# Patient Record
Sex: FEMALE | Race: BLACK OR AFRICAN AMERICAN | Employment: UNEMPLOYED | URBAN - METROPOLITAN AREA
[De-identification: names, ages, dates, MRNs, and addresses within clinical notes are randomized per-mention and may not be internally consistent; named-entity substitution may affect disease eponyms.]

---

## 2018-10-18 ENCOUNTER — HOSPITAL ENCOUNTER (EMERGENCY)
Facility: CLINIC | Age: 43
Discharge: HOME | End: 2018-10-18
Attending: EMERGENCY MEDICINE
Payer: MEDICARE

## 2018-10-18 ENCOUNTER — APPOINTMENT (OUTPATIENT)
Dept: GENERAL RADIOLOGY | Facility: CLINIC | Age: 43
End: 2018-10-18
Payer: MEDICARE

## 2018-10-18 VITALS
HEART RATE: 83 BPM | DIASTOLIC BLOOD PRESSURE: 76 MMHG | WEIGHT: 115 LBS | HEIGHT: 62 IN | SYSTOLIC BLOOD PRESSURE: 137 MMHG | TEMPERATURE: 97.8 F | OXYGEN SATURATION: 98 % | RESPIRATION RATE: 17 BRPM | BODY MASS INDEX: 21.16 KG/M2

## 2018-10-18 DIAGNOSIS — M79.662 PAIN OF LEFT LOWER LEG: ICD-10-CM

## 2018-10-18 DIAGNOSIS — S90.32XA CONTUSION OF LEFT FOOT INCLUDING TOES, INITIAL ENCOUNTER: Primary | ICD-10-CM

## 2018-10-18 DIAGNOSIS — S90.122A CONTUSION OF LEFT FOOT INCLUDING TOES, INITIAL ENCOUNTER: Primary | ICD-10-CM

## 2018-10-18 PROCEDURE — 73590 X-RAY EXAM OF LOWER LEG: CPT

## 2018-10-18 PROCEDURE — 99283 EMERGENCY DEPT VISIT LOW MDM: CPT

## 2018-10-18 PROCEDURE — 73630 X-RAY EXAM OF FOOT: CPT

## 2018-10-18 ASSESSMENT — PAIN DESCRIPTION - DESCRIPTORS: DESCRIPTORS: ACHING

## 2018-10-18 ASSESSMENT — ENCOUNTER SYMPTOMS
VOMITING: 0
CONSTIPATION: 0
COUGH: 0
ABDOMINAL PAIN: 0
EYE PAIN: 0
SHORTNESS OF BREATH: 0
BACK PAIN: 0
BLOOD IN STOOL: 0
DIARRHEA: 0
NAUSEA: 0

## 2018-10-18 ASSESSMENT — PAIN SCALES - GENERAL
PAINLEVEL_OUTOF10: 1
PAINLEVEL_OUTOF10: 4

## 2018-10-18 ASSESSMENT — PAIN DESCRIPTION - PAIN TYPE
TYPE: ACUTE PAIN
TYPE: ACUTE PAIN

## 2018-10-18 ASSESSMENT — PAIN DESCRIPTION - ORIENTATION
ORIENTATION: LEFT
ORIENTATION: LEFT

## 2018-10-18 ASSESSMENT — PAIN DESCRIPTION - LOCATION
LOCATION: FOOT;KNEE;ANKLE
LOCATION: FOOT

## 2018-10-18 ASSESSMENT — PAIN DESCRIPTION - PROGRESSION: CLINICAL_PROGRESSION: NOT CHANGED

## 2020-02-01 ENCOUNTER — APPOINTMENT (OUTPATIENT)
Dept: GENERAL RADIOLOGY | Facility: CLINIC | Age: 45
End: 2020-02-01
Payer: COMMERCIAL

## 2020-02-01 ENCOUNTER — HOSPITAL ENCOUNTER (EMERGENCY)
Facility: CLINIC | Age: 45
Discharge: HOME | End: 2020-02-01
Attending: EMERGENCY MEDICINE
Payer: COMMERCIAL

## 2020-02-01 VITALS
HEART RATE: 98 BPM | DIASTOLIC BLOOD PRESSURE: 92 MMHG | SYSTOLIC BLOOD PRESSURE: 150 MMHG | HEIGHT: 63 IN | OXYGEN SATURATION: 98 % | RESPIRATION RATE: 17 BRPM | TEMPERATURE: 98.5 F | WEIGHT: 118 LBS | BODY MASS INDEX: 20.91 KG/M2

## 2020-02-01 PROCEDURE — 99283 EMERGENCY DEPT VISIT LOW MDM: CPT

## 2020-02-01 PROCEDURE — 73630 X-RAY EXAM OF FOOT: CPT

## 2020-02-01 RX ORDER — GLATIRAMER 40 MG/ML
1 INJECTION, SOLUTION SUBCUTANEOUS
COMMUNITY
End: 2021-10-20

## 2020-02-01 RX ORDER — CHOLECALCIFEROL (VITAMIN D3) 1MM UNIT/G
LIQUID (ML) MISCELLANEOUS
COMMUNITY

## 2020-02-01 ASSESSMENT — PAIN DESCRIPTION - ORIENTATION: ORIENTATION: ANTERIOR

## 2020-02-01 ASSESSMENT — PAIN DESCRIPTION - DESCRIPTORS: DESCRIPTORS: SORE

## 2020-02-01 ASSESSMENT — PAIN DESCRIPTION - PAIN TYPE: TYPE: ACUTE PAIN

## 2020-02-01 ASSESSMENT — PAIN SCALES - GENERAL: PAINLEVEL_OUTOF10: 3

## 2020-02-01 ASSESSMENT — PAIN DESCRIPTION - LOCATION: LOCATION: FOOT

## 2020-02-01 ASSESSMENT — PAIN DESCRIPTION - FREQUENCY: FREQUENCY: CONTINUOUS

## 2020-02-02 NOTE — ED PROVIDER NOTES
eMERGENCY dEPARTMENT eNCOUnter      Pt Name: Rosey Tierney  MRN: [id number]  [place] 1975  Date of evaluation: 2/1/2020      CHIEF COMPLAINT       Chief Complaint   Patient presents with    Foot Injury     LEFT dropped a tote on top of foot on wednesday PMS         HISTORY OF PRESENT ILLNESS    Rosey Tierney is a 38 y.o. female who presents with left foot pain. Patient states on Wednesday she had  Checked a negative plastic top. She has been having pain and bruising to the area. Now starting to go to the side of her foot. No numbness, tingling, weakness. She is refusing anything for pain here        REVIEW OF SYSTEMS       Positive left foot pain. Positive bruising. Negative for numbness, tingling or weakness     PAST MEDICAL HISTORY    has a past medical history of Abnormal Pap smear, Abnormal Pap smear of cervix, Anxiety, Asthma, Depression, Migraine, Multiple sclerosis ([address] and Post-traumatic stress syndrome. SURGICAL HISTORY      has a past surgical history that includes Colposcopy; [place] tooth extraction; Corneal transplant (Bilateral); Tear duct surgery (Bilateral); and Eye surgery. CURRENT MEDICATIONS       Previous Medications    AMPHETAMINE (ADZENYS XR-ODT PO)    Take by mouth    BUTALBITAL-ACETAMINOPHEN-CAFFEINE (FIORICET, ESGIC) PER TABLET    Take 1 tablet by mouth as needed for Pain. CHOLECALCIFEROL (VITAMIN D3) 7660438 UNIT/GM LIQD    4 ml in AM, 2 ml in afternoon and 4 ml HS    FLUTICASONE PROPIONATE  HFA (FLOVENT HFA IN)    Inhale  into the lungs. GLATIRAMER ACETATE (COPAXONE) 40 MG/ML SOSY    Inject 1 mL into the skin    HYDROCODONE-ACETAMINOPHEN (NORCO) 5-325 MG PER TABLET        HYDROXYZINE (VISTARIL) 25 MG CAPSULE    Take 25 mg by mouth as needed. LAMOTRIGINE (LAMICTAL) 100 MG TABLET    Take 100 mg by mouth 2 times daily. PROAIR  (90 BASE) MCG/ACT INHALER        RESPIRATORY THERAPY SUPPLIES (NEBULIZER) EARNESTINE    by Does not apply route. interpretations:  XR FOOT LEFT (MIN 3 VIEWS)   Final Result   Acute nondisplaced fracture of the 5th proximal phalanx. LABS:  Labs Reviewed - No data to display      EMERGENCY DEPARTMENT COURSE:   Vitals:    Vitals:    02/01/20 2013   BP: (!) 150/92   Pulse: 98   Resp: 17   Temp: 98.5 °F (36.9 °C)   TempSrc: Oral   SpO2: 98%   Weight: 53.5 kg (118 lb)   Height: 5' 3\" (1.6 m)     -------------------------  BP: (!) 150/92, Temp: 98.5 °F (36.9 °C), Pulse: 98, Resp: 17    No orders of the defined types were placed in this encounter. Re-evaluation Notes    X-ray per radiology shows nondisplaced fracture at the base of the fifth proximal phalanx. Patient be placed in a postop shoe. Instructed over-the-counter Tylenol, Motrin for discomfort. Follow up as directed. Return if worse        FINAL IMPRESSION      1. Closed fracture of phalanx of left fifth toe, initial encounter          DISPOSITION/PLAN   DISPOSITION Decision To Discharge 02/01/2020 09:06:07 PM      Condition on Disposition    Stable    PATIENT REFERRED TO:  Remi Rogers MD  [address] [place] [address], [address] [place] [address]  [phone]    In 2 days        DISCHARGE MEDICATIONS:  New Prescriptions    No medications on file       (Please note that portions of this note were completed with a voice recognition program.  Efforts were made to edit the dictations but occasionally words are mis-transcribed.)    Ernst MD, F.A.C.E.P.   Attending Emergency Physician        iLz Wheat MD  02/01/20 0500

## 2021-10-20 ENCOUNTER — OFFICE VISIT (OUTPATIENT)
Dept: NEUROLOGY | Age: 46
End: 2021-10-20
Payer: COMMERCIAL

## 2021-10-20 VITALS
SYSTOLIC BLOOD PRESSURE: 125 MMHG | HEART RATE: 91 BPM | DIASTOLIC BLOOD PRESSURE: 79 MMHG | WEIGHT: 118 LBS | BODY MASS INDEX: 20.91 KG/M2 | HEIGHT: 63 IN

## 2021-10-20 DIAGNOSIS — E55.9 VITAMIN D DEFICIENCY: ICD-10-CM

## 2021-10-20 DIAGNOSIS — R27.0 ATAXIA: ICD-10-CM

## 2021-10-20 DIAGNOSIS — R26.9 GAIT DIFFICULTY: ICD-10-CM

## 2021-10-20 DIAGNOSIS — G95.9 MYELOPATHY (HCC): ICD-10-CM

## 2021-10-20 DIAGNOSIS — G35 EXACERBATION OF MULTIPLE SCLEROSIS (HCC): Primary | ICD-10-CM

## 2021-10-20 DIAGNOSIS — E53.9 VITAMIN B DEFICIENCY: ICD-10-CM

## 2021-10-20 DIAGNOSIS — B01.89 VARICELLA WITH COMPLICATION: ICD-10-CM

## 2021-10-20 DIAGNOSIS — R76.8 JC VIRUS ANTIBODY POSITIVE: ICD-10-CM

## 2021-10-20 PROCEDURE — 99205 OFFICE O/P NEW HI 60 MIN: CPT | Performed by: PSYCHIATRY & NEUROLOGY

## 2021-10-20 NOTE — LETTER
[facility]  [address] \[facility]\[address]  Phone: [phone]  Fax: [phone]    Aron Cannon MD    2021     Shawn Marshall, [address]  [place]  [address]    Patient: Abby Ellis   MR Number: [id number]   YOB: 1975   Date of Visit: 10/20/2021       Dear Shawn Marshall: Thank you for referring Yadi Rincon to me for evaluation/treatment. Below are the relevant portions of my assessment and plan of care. NEUROLOGY CONSULT  Patient Name:       Abby Ellis  :        1975  Clinic Visit Date:    10/20/2021      Dear Dr. Fito Anguiano MD     I had the opportunity to see your patient, Ms. Abby Ellis in neurology consultation today. As you know she  is a 40 y.o. right handed female presented for establishment of with known diagnosis of multiple sclerosis since . She is accompanied to the clinic by Mr Wilman Kaplan, significant other. Patient stated that she has had difficulties with walking and vision changes and she was diagnosed with multiple sclerosis in . She was on IV Solu-Medrol for few times at the onset of multiple sclerosis and she was on Copaxone for 3 years. She had suffered from side effects with blurred vision. Then it was switched to Vumerity capsules and she took it for 2 weeks. At that time her dad  of Covid vaccine. At that time; because of grief; she did not follow-up and never continued Vumerity capsules. Patient is transitioning from the care of her primary neurologist at the [facility], who retired recently. Presently she is having extreme difficulty walking and her problems had been getting worse for the past few weeks. She also has been having bladder dysfunction with urgency. She also has fatigue and tremulousness with abnormal involuntary movements. Also has a speech difficulty along with ongoing headaches with photophobia.   Also has memory difficulties; remembering recent conversations, etc.  Admits to anxiety and depression but denied suicidal ideations. DISEASE SUMMARY  Date of onset:   Date of diagnosis of MS:   Disease course at onset: Relapsing-Remitting  Current disease course: Relapsing-Remitting  Previous disease therapies: Copaxone (three times weekly) ( - )(caused loss of vision) ; Vumerity capsules (1st 2 wks of May 2021)(no side effects; stopped b/o dad  at that time)  Current disease therapy: none  CSF: (10/3/2016); RBC 1, WBC 2, Protein 41, VDRL -ve; MBP 4.55, IgG syn rate 60.7 ( 0-3.2)  IgG index 2.58 (0-0.7)  JCV serology result and date:   Vitamin D: > 120 (21)  Smoking status: none  EDSS: 7.5 consisting of wheelchair-bound; unable to take few steps; may need to help with transferring; severe ataxia; bladder dysfunction; decreased visual acuity and mild fatigue with anxiety/depression. 9HPT: 1 minute 33.47  T25W: could not do it. Review of systems done by staff reviewed and pertinent positives include Weakness, balance difficulties, tremors, speech difficulty, headaches, light sensitivity, memory difficulties, anxiety/depression. Current Outpatient Medications on File Prior to Visit   Medication Sig Dispense Refill    sertraline (ZOLOFT) 50 MG tablet Take 1 tablet by mouth daily      Cholecalciferol (VITAMIN D3) 5230231 UNIT/GM LIQD 4 ml in AM, 2 ml in afternoon and 4 ml HS      Amphetamine (ADZENYS XR-ODT PO) Take by mouth      PROAIR  (90 BASE) MCG/ACT inhaler       HYDROcodone-acetaminophen (NORCO) 5-325 MG per tablet       lamoTRIgine (LAMICTAL) 100 MG tablet Take 100 mg by mouth daily       butalbital-acetaminophen-caffeine (FIORICET, ESGIC) per tablet Take 1 tablet by mouth as needed for Pain.  Fluticasone Propionate  HFA (FLOVENT HFA IN) Inhale  into the lungs.  Respiratory Therapy Supplies (NEBULIZER) EARNESTINE by Does not apply route.  (Patient not taking: Reported on 10/20/2021)       No current facility-administered medications on file prior to visit. Allergies: Nathalie [place] is allergic to latex, red dye, asa [aspirin], brexpiprazole, naproxen, and other. Past Medical History:   Diagnosis Date    Abnormal Pap smear     unsure when  or what    Abnormal Pap smear of cervix 10/21/15     LSIL (-)HPV    Anxiety     Asthma     Depression     Migraine     Multiple sclerosis (HCC)     Post-traumatic stress syndrome        Past Surgical History:   Procedure Laterality Date    COLPOSCOPY      CORNEAL TRANSPLANT Bilateral     EYE SURGERY      stye removal    TEAR DUCT SURGERY Bilateral     WISDOM TOOTH EXTRACTION       Social History: Nathalie [place]  reports that she has never smoked. She has never used smokeless tobacco. She reports current alcohol use. She reports that she does not use drugs. Family History   Problem Relation Age of Onset   [facility] Mother         bile duct with mets everywhere    Diabetes Mother     High Blood Pressure Mother     Clotting Disorder Mother     Heart Disease Father         CHF    Diabetes Father     COPD Father     Clotting Disorder Father     Breast Cancer Maternal Grandmother     High Blood Pressure Maternal Grandmother     Tuberculosis Paternal Grandmother     Cancer Paternal Grandfather     Heart Disease Paternal Grandfather         CHF    COPD Maternal Grandfather        On exam: Blood pressure 125/79, pulse 91, height 5' 3\" (1.6 m), weight 118 lb (53.5 kg), not currently breastfeeding. NEUROLOGIC EXAMINATION  GENERAL  Appears comfortable and in no distress   HEENT  NC/ AT   NECK  Supple and no bruits heard   MENTAL STATUS:  Alert, oriented, intact memory, no confusion, normal speech, normal language, no hallucination or delusion   CRANIAL NERVES: II     -      PERRLA, decreased visual acuity bilaterally; Fundi reveal intact venous pulsations;   Visual fields intact to confrontation  III,IV,VI -  EOMs full, no afferent defect, no STEVEN, no ptosis  V     -     Diminished LT, PP on rt face   VII    -     Normal facial symmetry  VIII   -     Intact hearing  IX,X -     Symmetrical palate  XI    -     Symmetrical shoulder shrug  XII   -     Midline tongue, no atrophy    MOTOR FUNCTION:  significant for good strength of grade 5/5 in bilateral proximal and distal muscle groups of both upper and lower extremities with normal bulk, normal tone and no involuntary movements, no tremor   SENSORY FUNCTION:  impaired LT, PP, Temp and vibration in rt side of face, arm and leg   CEREBELLAR FUNCTION:  severe dysmetria bilaterally; Rt >> Lt    REFLEX FUNCTION:  Symmetric, no perverted reflex, equivocal on left, flexor on right   STATION and GAIT  needed help for stance and could not walk without 2 person assist; wheel chair bound       Lab Results   Component Value Date    WBC 6.0 11/13/2015    HGB 11.8 (L) 11/13/2015    HCT 37.3 11/13/2015    MCV 89.2 11/13/2015     11/13/2015    LYMPHOPCT 37 11/13/2015    RBC 4.18 11/13/2015    MCH 28.2 11/13/2015    MCHC 31.5 11/13/2015    RDW 14.8 (H) 11/13/2015         Diagnostic data reviewed with the patient:    Neuroimaging studies done at [facility] reviewed:    Cervical spine MRI (with/without) 2/1/2021: High signal foci throughout cervical spinal cord consistent with multiple sclerosis. No associated enhancement. Brain MRI (with/without) 1/31/2021: Extensive periventricular and deep cerebral white matter signal abnormalities; patchy T2/flair hyperintense signal abnormalities in brainstem and medial thalami which were not seen on prior study. No contrast-enhancement. Blood work from [address] [place] [address] reviewed:  CBC 6/11/2021: WBC 6.7, lymphocytes 25.6%. ,  ALC 1.7 (1.5-3.5  BMP 6/11/2021: Sodium 139, BUN 12, creatinine 0.57, glucose 89    Vitamin D 25 1/14/2021: >120  Vitamin B12 1/14/2021: >1500  MILEY 1/14/2021: Negative    Hepatitis BE antibody (9/23/2021): Negative  Hepatitis B core antibody (9/23/2021): Negative. Hepatitis B surface antigen (9/23/2021): Negative  Mycobacterium TB by QuantiFERON gold (9/23/2021): Negative  Varicella-zoster antibody, IgG (9/23/2021): Positive at 4.9 (<0.9)            Impression and Plan: Ms. Abby Ellis is a 40 y.o. female with   Exacerbation of multiple sclerosis; longstanding history of relapsing remitting multiple sclerosis; off of DMT since 2016; on 2-week course of Vumerity in May 2021; since then has not been on any disease modifying therapies for multiple sclerosis. Most recent brain MRI from [facility] reviewed and it demonstrated T2/FLAIR hyperintense lesions in brainstem and thalami which were not seen on prior study. Recommend in-patient admit to [facility] for pulse IV steroid therapy; needs urinalysis with reflex culture, CBC with differential and chest x-ray before initiating pulse IV steroid therapy. Also recommend MRI brain (W/WO), MRI cervical spine (W/WO), MRI thoracic spine (W/W0). ,  Vitamin D 25 hydroxy level, MIREYA virus testing, PT eval and treat. She also needs consultation with ID specialist for abnormal varicella testing. Extensive and detailed discussion done regarding possible options for disease modifying therapeutic agents. She might benefit from highly effective therapeutic agents including IV ocrelizumab or, natalizumab, etc.   Patient and her significant other verbalized understanding those instructions. Also stated that they prefer to get the testing done as outpatient at this point of time. But they will discuss among the results and get back to us. I personally spent total of 70 minutes from 3:45 PM to 4:55 PM with the patient while interviewing the patient, examining the patient, reviewing prior imaging studies and labs and discussing with patient and her significant other about the findings on those imaging studies and labs.   More than 50% of this visit was spent explaining the rationale for diagnosis and treatment and also discussing about further care plan. Follow-up in clinic in 2-4 weeks. This note was partially created using voice recognition software and is inherently subject to errors including those of syntax and \"sound alike\" substitutions which may escape proofreading. In such instances, original meaning may be extrapolated by contextual derivation. All items selected indicate a positive finding. Those items not selected are negative. Constitutional [] Weight loss/gain   [] Fatigue  [] Fever/Chills   HEENT [] Hearing Loss  [] Visual Disturbance  [x] Tinnitus  [] Eye pain   Respiratory [] Shortness of Breath  [] Cough  [] Snoring   Cardiovascular [] Chest Pain  [] Palpitations  [] Lightheaded   GI [] Constipation  [] Diarrhea  [x] Swallowing change    [] Urinary Frequency  [x] Urinary Urgency   Musculoskeletal [] Neck pain  [] Back pain  [] Muscle pain  [] Restless legs   Dermatologic [] Skin changes   Neurologic [x] Memory loss/confusion  [] Seizures  [x] Trouble walking or imbalance  [] Dizziness  [x] Weakness  [] Numbness  [x] Tremors  [x] Speech Difficulty  [x] Headaches  [x] Light Sensitivity  [] Sound Sensitivity   Endocrinology []Excessive thirst  []Excessive hunger   Psychiatric [x] Anxiety/Depression  [] Hallucination   Allergy/immunology [x]Hives/environmental allergies   Hematologic/lymph [] Abnormal bleeding  [x] Abnormal bruising     9HPT: 1 minute 33.47 seconds  T25W: unable to obtain, pt in wheelchair         If you have questions, please do not hesitate to call me. I look forward to following Deidre Arjun along with you.     Sincerely,      Lazaro Haynes MD

## 2021-10-20 NOTE — PROGRESS NOTES
All items selected indicate a positive finding. Those items not selected are negative.   Constitutional [] Weight loss/gain   [] Fatigue  [] Fever/Chills   HEENT [] Hearing Loss  [] Visual Disturbance  [x] Tinnitus  [] Eye pain   Respiratory [] Shortness of Breath  [] Cough  [] Snoring   Cardiovascular [] Chest Pain  [] Palpitations  [] Lightheaded   GI [] Constipation  [] Diarrhea  [x] Swallowing change    [] Urinary Frequency  [x] Urinary Urgency   Musculoskeletal [] Neck pain  [] Back pain  [] Muscle pain  [] Restless legs   Dermatologic [] Skin changes   Neurologic [x] Memory loss/confusion  [] Seizures  [x] Trouble walking or imbalance  [] Dizziness  [x] Weakness  [] Numbness  [x] Tremors  [x] Speech Difficulty  [x] Headaches  [x] Light Sensitivity  [] Sound Sensitivity   Endocrinology []Excessive thirst  []Excessive hunger   Psychiatric [x] Anxiety/Depression  [] Hallucination   Allergy/immunology [x]Hives/environmental allergies   Hematologic/lymph [] Abnormal bleeding  [x] Abnormal bruising     9HPT: 1 minute 33.47 seconds  T25W: unable to obtain, pt in wheelchair

## 2021-10-20 NOTE — PROGRESS NOTES
NEUROLOGY CONSULT  Patient Name:       Jessica Payne  :        1975  Clinic Visit Date:    10/20/2021      Dear Dr. Dustin Thompson MD     I had the opportunity to see your patient, Ms. Jessica Payne in neurology consultation today. As you know she  is a 40 y.o. right handed female presented for establishment of with known diagnosis of multiple sclerosis since . She is accompanied to the clinic by Mr Nu Reyes, significant other. Patient stated that she has had difficulties with walking and vision changes and she was diagnosed with multiple sclerosis in . She was on IV Solu-Medrol for few times at the onset of multiple sclerosis and she was on Copaxone for 3 years. She had suffered from side effects with blurred vision. Then it was switched to Vumerity capsules and she took it for 2 weeks. At that time her dad  of Covid vaccine. At that time; because of grief; she did not follow-up and never continued Vumerity capsules. Patient is transitioning from the care of her primary neurologist at the [facility], who retired recently. Presently she is having extreme difficulty walking and her problems had been getting worse for the past few weeks. She also has been having bladder dysfunction with urgency. She also has fatigue and tremulousness with abnormal involuntary movements. Also has a speech difficulty along with ongoing headaches with photophobia. Also has memory difficulties; remembering recent conversations, etc.  Admits to anxiety and depression but denied suicidal ideations. DISEASE SUMMARY  Date of onset:   Date of diagnosis of MS:   Disease course at onset: Relapsing-Remitting  Current disease course: Relapsing-Remitting  Previous disease therapies: Copaxone (three times weekly) ( - )(caused loss of vision) ;  Vumerity capsules (1st 2 wks of May 2021)(no side effects; stopped b/o dad  at that time)  Current disease therapy: none  CSF: (10/3/2016); RBC 1, WBC 2, Protein 41, VDRL -ve; MBP 4.55, IgG syn rate 60.7 ( 0-3.2)  IgG index 2.58 (0-0.7)  JCV serology result and date:   Vitamin D: > 120 (1/14/21)  Smoking status: none  EDSS: 7.5 consisting of wheelchair-bound; unable to take few steps; may need to help with transferring; severe ataxia; bladder dysfunction; decreased visual acuity and mild fatigue with anxiety/depression. 9HPT: 1 minute 33.47  T25W: could not do it. Review of systems done by staff reviewed and pertinent positives include Weakness, balance difficulties, tremors, speech difficulty, headaches, light sensitivity, memory difficulties, anxiety/depression. Current Outpatient Medications on File Prior to Visit   Medication Sig Dispense Refill    sertraline (ZOLOFT) 50 MG tablet Take 1 tablet by mouth daily      Cholecalciferol (VITAMIN D3) 8537933 UNIT/GM LIQD 4 ml in AM, 2 ml in afternoon and 4 ml HS      Amphetamine (ADZENYS XR-ODT PO) Take by mouth      PROAIR  (90 BASE) MCG/ACT inhaler       HYDROcodone-acetaminophen (NORCO) 5-325 MG per tablet       lamoTRIgine (LAMICTAL) 100 MG tablet Take 100 mg by mouth daily       butalbital-acetaminophen-caffeine (FIORICET, ESGIC) per tablet Take 1 tablet by mouth as needed for Pain.  Fluticasone Propionate  HFA (FLOVENT HFA IN) Inhale  into the lungs.  Respiratory Therapy Supplies (NEBULIZER) EARNESTINE by Does not apply route. (Patient not taking: Reported on 10/20/2021)       No current facility-administered medications on file prior to visit. Allergies: Alessia Ferrari is allergic to latex, red dye, asa [aspirin], brexpiprazole, naproxen, and other.     Past Medical History:   Diagnosis Date    Abnormal Pap smear     unsure when  or what    Abnormal Pap smear of cervix 10/21/15     LSIL (-)HPV    Anxiety     Asthma     Depression     Migraine     Multiple sclerosis (HCC)     Post-traumatic stress syndrome        Past Surgical History:   Procedure Laterality Date    COLPOSCOPY      CORNEAL TRANSPLANT Bilateral     EYE SURGERY      stye removal    TEAR DUCT SURGERY Bilateral     WISDOM TOOTH EXTRACTION       Social History: Talya Solano  reports that she has never smoked. She has never used smokeless tobacco. She reports current alcohol use. She reports that she does not use drugs. Family History   Problem Relation Age of Onset   Carolyne Petersen Cancer Mother         bile duct with mets everywhere    Diabetes Mother     High Blood Pressure Mother     Clotting Disorder Mother     Heart Disease Father         CHF    Diabetes Father     COPD Father     Clotting Disorder Father     Breast Cancer Maternal Grandmother     High Blood Pressure Maternal Grandmother     Tuberculosis Paternal Grandmother     Cancer Paternal Grandfather     Heart Disease Paternal Grandfather         CHF    COPD Maternal Grandfather        On exam: Blood pressure 125/79, pulse 91, height 5' 3\" (1.6 m), weight 118 lb (53.5 kg), not currently breastfeeding. NEUROLOGIC EXAMINATION  GENERAL  Appears comfortable and in no distress   HEENT  NC/ AT   NECK  Supple and no bruits heard   MENTAL STATUS:  Alert, oriented, intact memory, no confusion, normal speech, normal language, no hallucination or delusion   CRANIAL NERVES: II     -      PERRLA, decreased visual acuity bilaterally; Fundi reveal intact venous pulsations;   Visual fields intact to confrontation  III,IV,VI -  EOMs full, no afferent defect, no STEVEN, no ptosis  V     -     Diminished LT, PP on rt face   VII    -     Normal facial symmetry  VIII   -     Intact hearing  IX,X -     Symmetrical palate  XI    -     Symmetrical shoulder shrug  XII   -     Midline tongue, no atrophy    MOTOR FUNCTION:  significant for good strength of grade 5/5 in bilateral proximal and distal muscle groups of both upper and lower extremities with normal bulk, normal tone and no involuntary movements, no tremor   SENSORY FUNCTION:  impaired LT, PP, Temp and vibration in rt side of face, arm and leg   CEREBELLAR FUNCTION:  severe dysmetria bilaterally; Rt >> Lt    REFLEX FUNCTION:  Symmetric, no perverted reflex, equivocal on left, flexor on right   STATION and GAIT  needed help for stance and could not walk without 2 person assist; wheel chair bound       Lab Results   Component Value Date    WBC 6.0 11/13/2015    HGB 11.8 (L) 11/13/2015    HCT 37.3 11/13/2015    MCV 89.2 11/13/2015     11/13/2015    LYMPHOPCT 37 11/13/2015    RBC 4.18 11/13/2015    MCH 28.2 11/13/2015    MCHC 31.5 11/13/2015    RDW 14.8 (H) 11/13/2015         Diagnostic data reviewed with the patient:    Neuroimaging studies done at [facility] reviewed:    Cervical spine MRI (with/without) 2/1/2021: High signal foci throughout cervical spinal cord consistent with multiple sclerosis. No associated enhancement. Brain MRI (with/without) 1/31/2021: Extensive periventricular and deep cerebral white matter signal abnormalities; patchy T2/flair hyperintense signal abnormalities in brainstem and medial thalami which were not seen on prior study. No contrast-enhancement. Blood work from [facility] reviewed:  CBC 6/11/2021: WBC 6.7, lymphocytes 25.6%. ,  ALC 1.7 (1.5-3.5  BMP 6/11/2021: Sodium 139, BUN 12, creatinine 0.57, glucose 89    Vitamin D 25 1/14/2021: >120  Vitamin B12 1/14/2021: >1500  MILEY 1/14/2021: Negative    Hepatitis BE antibody (9/23/2021): Negative  Hepatitis B core antibody (9/23/2021): Negative.   Hepatitis B surface antigen (9/23/2021): Negative  Mycobacterium TB by QuantiFERON gold (9/23/2021): Negative  Varicella-zoster antibody, IgG (9/23/2021): Positive at 4.9 (<0.9)            Impression and Plan: Ms. Sadiq Weiss is a 40 y.o. female with   Exacerbation of multiple sclerosis; longstanding history of relapsing remitting multiple sclerosis; off of DMT since 2016; on 2-week course of Vumerity in May 2021; since then has not been on any disease modifying therapies for multiple sclerosis. Most recent brain MRI from [facility] reviewed and it demonstrated T2/FLAIR hyperintense lesions in brainstem and thalami which were not seen on prior study. Recommend in-patient admit to [facility] for pulse IV steroid therapy; needs urinalysis with reflex culture, CBC with differential and chest x-ray before initiating pulse IV steroid therapy. Also recommend MRI brain (W/WO), MRI cervical spine (W/WO), MRI thoracic spine (W/W0). ,  Vitamin D 25 hydroxy level, MIREYA virus testing, PT eval and treat. She also needs consultation with ID specialist for abnormal varicella testing. Extensive and detailed discussion done regarding possible options for disease modifying therapeutic agents. She might benefit from highly effective therapeutic agents including IV ocrelizumab or, natalizumab, etc.   Patient and her significant other verbalized understanding those instructions. Also stated that they prefer to get the testing done as outpatient at this point of time. But they will discuss among the results and get back to us. I personally spent total of 70 minutes from 3:45 PM to 4:55 PM with the patient while interviewing the patient, examining the patient, reviewing prior imaging studies and labs and discussing with patient and her significant other about the findings on those imaging studies and labs. More than 50% of this visit was spent explaining the rationale for diagnosis and treatment and also discussing about further care plan. Follow-up in clinic in 2-4 weeks. This note was partially created using voice recognition software and is inherently subject to errors including those of syntax and \"sound alike\" substitutions which may escape proofreading. In such instances, original meaning may be extrapolated by contextual derivation.

## 2021-11-04 ENCOUNTER — OFFICE VISIT (OUTPATIENT)
Dept: INFECTIOUS DISEASES | Age: 46
End: 2021-11-04
Payer: COMMERCIAL

## 2021-11-04 VITALS
TEMPERATURE: 98.2 F | HEIGHT: 63 IN | OXYGEN SATURATION: 100 % | SYSTOLIC BLOOD PRESSURE: 107 MMHG | DIASTOLIC BLOOD PRESSURE: 77 MMHG | WEIGHT: 109 LBS | HEART RATE: 87 BPM | RESPIRATION RATE: 18 BRPM | BODY MASS INDEX: 19.31 KG/M2

## 2021-11-04 DIAGNOSIS — Z01.84 IMMUNITY TO VARICELLA DETERMINED BY SEROLOGIC TEST: Primary | ICD-10-CM

## 2021-11-04 PROCEDURE — 99203 OFFICE O/P NEW LOW 30 MIN: CPT | Performed by: INTERNAL MEDICINE

## 2021-11-04 NOTE — PROGRESS NOTES
[facility]    [facility]   Office Consult Note  Today's Date and Time: 11/4/2021, 10:36 AM    Impression:     1. Immunity to varicella determined by serologic test         Recommendations   · The abnormal varicella test results presents immunity to varicella-zoster virus which would make sense given the patient reports a history of chickenpox as a child. · This does not represent a problem for her and if there is consideration for disease modifying agents it may be reasonable to also consider her to get the Shingrix vaccine prior to starting these agents especially if these will continue long-term. · Otherwise at this point in time there is no intervention needed. · Patient can follow-up with me on an as-needed basis. I have ordered the following medications/ labs:  No orders of the defined types were placed in this encounter. No orders of the defined types were placed in this encounter. Chief complaint/reason for consultation:     Chief Complaint   Patient presents with    Varicella     New Patient         History of Present Illness:   Maria E Nurse is a 40y.o.-year-old female who is seen at there request of Luis Guillaume is here with her  for her visit and has a history of multiple sclerosis diagnosed in 2014 and was recently seen by [facility] neurology as she was transitioning care from her primary neurologist at the [facility] was retiring. The patient has bladder dysfunction, difficulty with walking and vision. She has issues with tremulousness with abnormal involuntary movement and some memory issues. The patient longstanding history of relapsing remitting disease and had varicella testing that was abnormal and the patient was sent in to see me for this. Hepatitis BE antibody (9/23/2021): Negative  Hepatitis B core antibody (9/23/2021): Negative.   Hepatitis B surface antigen (9/23/2021): Negative  Mycobacterium TB by QuantiFERON gold (9/23/2021): Negative  Varicella-zoster antibody, IgG (9/23/2021): Positive at 4.9 (<0.9)    The patient is being considered for disease modifying therapeutic agents and did also have blood testing sent out for PML. The patient otherwise is here with no acute complaints. I have personally reviewed the past medical history,past surgical history, medications, social history, and family history, and I have updated the database accordingly. PastMedical History:     Past Medical History:   Diagnosis Date    Abnormal Pap smear     unsure when  or what    Abnormal Pap smear of cervix 10/21/15     LSIL (-)HPV    Anxiety     Asthma     Depression     Migraine     Multiple sclerosis (HCC)     Post-traumatic stress syndrome      Past Surgical  History:     Past Surgical History:   Procedure Laterality Date    COLPOSCOPY      CORNEAL TRANSPLANT Bilateral     EYE SURGERY      stye removal    TEAR DUCT SURGERY Bilateral     WISDOM TOOTH EXTRACTION       Medications:     Current Outpatient Medications   Medication Sig Dispense Refill    Cholecalciferol (VITAMIN D3) [id number] UNIT/GM LIQD 4 ml in AM, 2 ml in afternoon and 4 ml HS      Amphetamine (ADZENYS XR-ODT PO) Take by mouth      PROAIR  (90 BASE) MCG/ACT inhaler       HYDROcodone-acetaminophen (NORCO) 5-325 MG per tablet       lamoTRIgine (LAMICTAL) 100 MG tablet Take 100 mg by mouth daily       butalbital-acetaminophen-caffeine (FIORICET, ESGIC) per tablet Take 1 tablet by mouth as needed for Pain.  Fluticasone Propionate  HFA (FLOVENT HFA IN) Inhale  into the lungs. No current facility-administered medications for this visit.       Social History:     Social History     Socioeconomic History    Marital status: Single     Spouse name: Not on file    Number of children: Not on file    Years of education: Not on file    Highest education level: Not on file   Occupational History    Not on file   Tobacco Use    Smoking status: Never Smoker    Smokeless tobacco: Never Used   Substance and Sexual Activity    Alcohol use: Yes     Comment: social    Drug use: No    Sexual activity: Not Currently   Other Topics Concern    Not on file   Social History Narrative    Not on file     Social Determinants of Health     Financial Resource Strain:     Difficulty of Paying Living Expenses:    Food Insecurity:     Worried About Running Out of Food in the Last Year:     [address] in the Last Year:    Transportation Needs:     Lack of Transportation (Medical):  Lack of Transportation (Non-Medical):    Physical Activity:     Days of Exercise per Week:     Minutes of Exercise per Session:    Stress:     Feeling of Stress :    Social Connections:     Frequency of Communication with Friends and Family:     Frequency of Social Gatherings with Friends and Family:     Attends Buddhist Services:     Active Member of Clubs or Organizations:     Attends Club or Organization Meetings:     Marital Status:    Intimate Partner Violence:     Fear of Current or Ex-Partner:     Emotionally Abused:     Physically Abused:     Sexually Abused:      Family History:     Family History   Problem Relation Age of Onset    Cancer Mother         bile duct with mets everywhere    Diabetes Mother     High Blood Pressure Mother     Clotting Disorder Mother     Heart Disease Father         CHF    Diabetes Father     COPD Father     Clotting Disorder Father     Breast Cancer Maternal Grandmother     High Blood Pressure Maternal Grandmother     Tuberculosis Paternal Grandmother     Cancer Paternal Grandfather     Heart Disease Paternal Grandfather         CHF    COPD Maternal Grandfather       Allergies:   Latex, Red dye, Scopolamine, Asa [aspirin], Brexpiprazole, Duloxetine, Naproxen, Nitrofurantoin, and Other     Review of Systems:   Review of Systems   Constitutional: Negative. Respiratory: Negative. Cardiovascular: Negative. Gastrointestinal: Negative. Genitourinary: Negative. Musculoskeletal: Negative. Skin: Negative. Neurological: Negative. Psychiatric/Behavioral: Negative. Physical Examination :   /77 (Site: Right Upper Arm, Position: Sitting, Cuff Size: Medium Adult)   Pulse 87   Temp 98.2 °F (36.8 °C) (Temporal)   Resp 18   Ht 5' 3\" (1.6 m)   Wt 109 lb (49.4 kg)   SpO2 100% Comment: room air at rest  BMI 19.31 kg/m²     Physical Exam  Constitutional:       Appearance: She is well-developed. HENT:      Head: Normocephalic and atraumatic. Cardiovascular:      Rate and Rhythm: Regular rhythm. Heart sounds: Normal heart sounds. Pulmonary:      Effort: Pulmonary effort is normal.      Breath sounds: Normal breath sounds. Abdominal:      General: Bowel sounds are normal.      Palpations: Abdomen is soft. Musculoskeletal:      Cervical back: Neck supple. Skin:     General: Skin is warm and dry. Neurological:      Mental Status: She is alert and oriented to person, place, and time. Comments: The patient is sitting in a wheelchair         Medical Decision Making:   I haveindependently reviewed the following labs:  CBC with Differential:  Lab Results   Component Value Date    WBC 6.0 11/13/2015    HGB 11.8 11/13/2015    HCT 37.3 11/13/2015     11/13/2015    LYMPHOPCT 37 11/13/2015    MONOPCT 7 11/13/2015     BMP:No results found for: NA, K, CL, CO2, BUN, CREATININE, MG  Hepatic Function Panel: No results found for: PROT, LABALBU, BILIDIR, IBILI, BILITOT, ALKPHOS, ALT, AST  No results found for: CRP  No results found for: SEDRATE    10/20/2021  3:37 PM - System User, Default    Contains abnormal data Varicella zoster antibody, IgG  Order: [id number]  (suggestion)  Information displayed in this report will not trend and will not trigger automated decision support.     Ref Range & Units Value   Varicella IgG <0.9 AI 4.9High     Comment:        ----------Interpretation-------- <0.9         Negative   0.9 - 1.0    Equivocal   >1.0         Positive   --------------------------------   10/20/2021  3:37 PM - System User, Default    Hepatitis B surface antigen  Order: [id number]  (suggestion)  Information displayed in this report will not trend and will not trigger automated decision support. Ref Range & Units Value   Hepatitis B Surface Ag Negative^Negative  Negative    10/20/2021  3:37 PM - System User, Default    Hepatitis B core antibody, total  Order: [id number]  (suggestion)  Information displayed in this report will not trend and will not trigger automated decision support. Ref Range & Units Value   Hep B Core Total Ab Negative^Negative  Negative      10/20/2021  3:37 PM - System User, Default    Hepatitis BE AB, S  Order: [id number]  (suggestion)  Information displayed in this report will not trend and will not trigger automated decision support. Ref Range & Units Value   Hepatitis anti hbe NEGATIVE  Negative    10/20/2021  3:37 PM - System User, Default    Mycobacterium TB by Marisel Mckenna  Order: [id number]  (suggestion)  Information displayed in this report will not trend and will not trigger automated decision support. Ref Range & Units Value   TB Result NEGATIVE  Negative        TB1 AG MINUS NIL <0.35 IU/mL 0.05        TB2 AG MINUS NIL <0.35 IU/mL 0.06        MITOGEN MINUS NIL   >10        NIL RESULT  IU/mL 0.06        TB interpretation   See below              Thank you for allowing us to participate in the care of this patient. Pleasecall with questions. Izzy Mclaughlin MD  [employer] messaging: [phone]    This note is created with the assistance of a speech recognition program.  While intending to generate a document that actually reflects the content ofthe visit, the document can still have some errors including those of syntax and sound a like substitutions which may escape proof reading.   It such instances, actual meaning can be extrapolated by contextual diversion.

## 2021-11-07 ENCOUNTER — HOSPITAL ENCOUNTER (OUTPATIENT)
Facility: CLINIC | Age: 46
Discharge: HOME | End: 2021-11-07
Payer: COMMERCIAL

## 2021-11-07 DIAGNOSIS — E53.9 VITAMIN B DEFICIENCY: ICD-10-CM

## 2021-11-07 DIAGNOSIS — G35 EXACERBATION OF MULTIPLE SCLEROSIS (HCC): ICD-10-CM

## 2021-11-07 DIAGNOSIS — E55.9 VITAMIN D DEFICIENCY: ICD-10-CM

## 2021-11-07 LAB
-: ABNORMAL
ABSOLUTE EOS #: 0.15 K/UL (ref 0–0.44)
ABSOLUTE IMMATURE GRANULOCYTE: <0.03 K/UL (ref 0–0.3)
ABSOLUTE LYMPH #: 1.98 K/UL (ref 1.1–3.7)
ABSOLUTE MONO #: 0.29 K/UL (ref 0.1–1.2)
AMORPHOUS: ABNORMAL
BACTERIA: ABNORMAL
BASOPHILS # BLD: 1 % (ref 0–2)
BASOPHILS ABSOLUTE: <0.03 K/UL (ref 0–0.2)
BILIRUBIN URINE: NEGATIVE
CASTS UA: ABNORMAL /LPF (ref 0–2)
CASTS UA: ABNORMAL /LPF (ref 0–2)
COLOR: YELLOW
COMMENT UA: ABNORMAL
CRYSTALS, UA: ABNORMAL /HPF
CRYSTALS, UA: ABNORMAL /HPF
DIFFERENTIAL TYPE: ABNORMAL
EOSINOPHILS RELATIVE PERCENT: 4 % (ref 1–4)
EPITHELIAL CELLS UA: ABNORMAL /HPF (ref 0–5)
FOLATE: 13.3 NG/ML
GLUCOSE URINE: NEGATIVE
HCT VFR BLD CALC: 43.7 % (ref 36.3–47.1)
HEMOGLOBIN: 14.4 G/DL (ref 11.9–15.1)
IMMATURE GRANULOCYTES: 0 %
KETONES, URINE: NEGATIVE
LEUKOCYTE ESTERASE, URINE: NEGATIVE
LYMPHOCYTES # BLD: 45 % (ref 24–43)
MCH RBC QN AUTO: 31.4 PG (ref 25.2–33.5)
MCHC RBC AUTO-ENTMCNC: 33 G/DL (ref 28.4–34.8)
MCV RBC AUTO: 95.2 FL (ref 82.6–102.9)
MONOCYTES # BLD: 7 % (ref 3–12)
MUCUS: ABNORMAL
NITRITE, URINE: NEGATIVE
NRBC AUTOMATED: 0 PER 100 WBC
OTHER OBSERVATIONS UA: ABNORMAL
PDW BLD-RTO: 11.9 % (ref 11.8–14.4)
PH UA: 5.5 (ref 5–8)
PLATELET # BLD: 260 K/UL (ref 138–453)
PLATELET ESTIMATE: ABNORMAL
PMV BLD AUTO: 10.9 FL (ref 8.1–13.5)
PROTEIN UA: NEGATIVE
RBC # BLD: 4.59 M/UL (ref 3.95–5.11)
RBC # BLD: ABNORMAL 10*6/UL
RBC UA: ABNORMAL /HPF (ref 0–2)
RENAL EPITHELIAL, UA: ABNORMAL /HPF
SEG NEUTROPHILS: 43 % (ref 36–65)
SEGMENTED NEUTROPHILS ABSOLUTE COUNT: 1.85 K/UL (ref 1.5–8.1)
SPECIFIC GRAVITY UA: 1.03 (ref 1–1.03)
TRICHOMONAS: ABNORMAL
TURBIDITY: ABNORMAL
URINE HGB: ABNORMAL
UROBILINOGEN, URINE: NORMAL
VITAMIN B-12: 995 PG/ML (ref 232–1245)
VITAMIN D 25-HYDROXY: 95.2 NG/ML (ref 30–100)
WBC # BLD: 4.3 K/UL (ref 3.5–11.3)
WBC # BLD: ABNORMAL 10*3/UL
WBC UA: ABNORMAL /HPF (ref 0–5)
YEAST: ABNORMAL

## 2021-11-07 PROCEDURE — 36415 COLL VENOUS BLD VENIPUNCTURE: CPT

## 2021-11-07 PROCEDURE — 82746 ASSAY OF FOLIC ACID SERUM: CPT

## 2021-11-07 PROCEDURE — 81001 URINALYSIS AUTO W/SCOPE: CPT

## 2021-11-07 PROCEDURE — 82306 VITAMIN D 25 HYDROXY: CPT

## 2021-11-07 PROCEDURE — 85025 COMPLETE CBC W/AUTO DIFF WBC: CPT

## 2021-11-07 PROCEDURE — 84207 ASSAY OF VITAMIN B-6: CPT

## 2021-11-07 PROCEDURE — 82607 VITAMIN B-12: CPT

## 2021-11-08 ENCOUNTER — TELEPHONE (OUTPATIENT)
Dept: NEUROLOGY | Age: 46
End: 2021-11-08

## 2021-11-08 NOTE — TELEPHONE ENCOUNTER
[facility] called in today and left a message. She has her MRI's scheduled for Wed. She said she needs a rx. to help relax her and help with claustrophobia. . She has used Valium in the past.  Please send RX to [facility] [address].

## 2021-11-09 DIAGNOSIS — G35 EXACERBATION OF MULTIPLE SCLEROSIS (HCC): ICD-10-CM

## 2021-11-09 DIAGNOSIS — F40.240 CLAUSTROPHOBIA: Primary | ICD-10-CM

## 2021-11-09 DIAGNOSIS — G95.9 MYELOPATHY (HCC): ICD-10-CM

## 2021-11-09 RX ORDER — DIAZEPAM 2 MG/1
TABLET ORAL
Qty: 2 TABLET | Refills: 0 | Status: SHIPPED | OUTPATIENT
Start: 2021-11-09 | End: 2021-12-09

## 2021-11-09 NOTE — TELEPHONE ENCOUNTER
Please let the patient know that Valium 2 mg x2 tab prescription for claustrophobia for MRI sent.   Thank you.   -dr. Rayshawn Davila

## 2021-11-10 ENCOUNTER — HOSPITAL ENCOUNTER (OUTPATIENT)
Dept: MRI IMAGING | Facility: CLINIC | Age: 46
Discharge: HOME | End: 2021-11-12
Payer: COMMERCIAL

## 2021-11-10 DIAGNOSIS — G35 EXACERBATION OF MULTIPLE SCLEROSIS (HCC): ICD-10-CM

## 2021-11-10 DIAGNOSIS — R26.9 GAIT DIFFICULTY: ICD-10-CM

## 2021-11-10 DIAGNOSIS — G95.9 MYELOPATHY (HCC): ICD-10-CM

## 2021-11-10 DIAGNOSIS — R27.0 ATAXIA: ICD-10-CM

## 2021-11-10 PROCEDURE — 72157 MRI CHEST SPINE W/O & W/DYE: CPT

## 2021-11-10 PROCEDURE — 72156 MRI NECK SPINE W/O & W/DYE: CPT

## 2021-11-10 PROCEDURE — A9579 GAD-BASE MR CONTRAST NOS,1ML: HCPCS | Performed by: PSYCHIATRY & NEUROLOGY

## 2021-11-10 PROCEDURE — 6360000004 HC RX CONTRAST MEDICATION: Performed by: PSYCHIATRY & NEUROLOGY

## 2021-11-10 PROCEDURE — 70553 MRI BRAIN STEM W/O & W/DYE: CPT

## 2021-11-10 RX ADMIN — GADOTERIDOL 10 ML: 279.3 INJECTION, SOLUTION INTRAVENOUS at 15:06

## 2021-11-10 ASSESSMENT — ENCOUNTER SYMPTOMS
RESPIRATORY NEGATIVE: 1
GASTROINTESTINAL NEGATIVE: 1

## 2021-11-11 LAB — VITAMIN B6: 39 NMOL/L (ref 20–125)

## 2021-11-23 ENCOUNTER — OFFICE VISIT (OUTPATIENT)
Dept: NEUROLOGY | Age: 46
End: 2021-11-23
Payer: COMMERCIAL

## 2021-11-23 VITALS
DIASTOLIC BLOOD PRESSURE: 77 MMHG | HEART RATE: 87 BPM | HEIGHT: 63 IN | SYSTOLIC BLOOD PRESSURE: 114 MMHG | WEIGHT: 109 LBS | BODY MASS INDEX: 19.31 KG/M2

## 2021-11-23 DIAGNOSIS — G82.20 LOWER PARAPLEGIA (HCC): ICD-10-CM

## 2021-11-23 DIAGNOSIS — R76.8 JC VIRUS ANTIBODY POSITIVE: ICD-10-CM

## 2021-11-23 DIAGNOSIS — G35 MULTIPLE SCLEROSIS, RELAPSING-REMITTING (HCC): Primary | ICD-10-CM

## 2021-11-23 DIAGNOSIS — G25.0 TREMOR, ESSENTIAL: ICD-10-CM

## 2021-11-23 PROCEDURE — 99214 OFFICE O/P EST MOD 30 MIN: CPT | Performed by: PSYCHIATRY & NEUROLOGY

## 2021-11-23 RX ORDER — SODIUM CHLORIDE 0.9 % (FLUSH) 0.9 %
5-40 SYRINGE (ML) INJECTION PRN
Status: CANCELLED | OUTPATIENT
Start: 2021-11-23

## 2021-11-23 RX ORDER — SODIUM CHLORIDE 9 MG/ML
25 INJECTION, SOLUTION INTRAVENOUS PRN
Status: CANCELLED | OUTPATIENT
Start: 2021-11-23

## 2021-11-23 RX ORDER — ALPRAZOLAM 0.5 MG/1
0.5 TABLET ORAL NIGHTLY PRN
COMMUNITY
Start: 2021-11-09

## 2021-11-23 RX ORDER — HEPARIN SODIUM (PORCINE) LOCK FLUSH IV SOLN 100 UNIT/ML 100 UNIT/ML
500 SOLUTION INTRAVENOUS PRN
Status: CANCELLED | OUTPATIENT
Start: 2021-11-23

## 2021-11-23 RX ORDER — PRIMIDONE 50 MG/1
TABLET ORAL
Qty: 90 TABLET | Refills: 1 | Status: SHIPPED | OUTPATIENT
Start: 2021-11-23

## 2021-11-23 NOTE — PROGRESS NOTES
All items selected indicate a positive finding. Those items not selected are negative.   Constitutional [] Weight loss/gain   [] Fatigue  [] Fever/Chills   HEENT [] Hearing Loss  [] Visual Disturbance  [] Tinnitus  [] Eye pain   Respiratory [] Shortness of Breath  [] Cough  [] Snoring   Cardiovascular [] Chest Pain  [] Palpitations  [] Lightheaded   GI [] Constipation  [] Diarrhea  [] Swallowing change  [] Nausea/vomiting    [] Urinary Frequency  [] Urinary Urgency   Musculoskeletal [x] Neck pain  [] Back pain  [] Muscle pain  [] Restless legs   Dermatologic [] Skin changes   Neurologic [] Memory loss/confusion  [] Seizures  [x] Trouble walking or imbalance  [] Dizziness  [] Sleep disturbance  [x] Weakness  [x] Numbness  [] Tremors  [] Speech Difficulty  [x] Headaches  [] Light Sensitivity  [] Sound Sensitivity   Endocrinology []Excessive thirst  []Excessive hunger   Psychiatric [x] Anxiety/Depression  [] Hallucination   Allergy/immunology []Hives/environmental allergies   Hematologic/lymph [] Abnormal bleeding  [] Abnormal bruising

## 2021-11-23 NOTE — PROGRESS NOTES
Relapsing-Remitting  Current disease course: Relapsing-Remitting  Previous disease therapies: Copaxone (three times weekly) ( - )(caused loss of vision) ; Vumerity capsules (1st 2 wks of May 2021)(no side effects; stopped b/o dad  at that time)  Current disease therapy: none  CSF: (10/3/2016); RBC 1, WBC 2, Protein 41, VDRL -ve; MBP 4.55, IgG syn rate 60.7 ( 0-3.2)  IgG index 2.58 (0-0.7). , OCB present in csf but not in serum; ? #   JCV serology result and date: MIREYA Virus +ve with 2.53 index (21)   Vitamin D: > 120 (21)  Smoking status: none  EDSS: 7.5 consisting of wheelchair-bound; unable to take few steps; may need to help with transferring; severe ataxia; bladder dysfunction; decreased visual acuity and mild fatigue with anxiety/depression. 9HPT: 1 minute 33.47  T25W: could not do it. Review of systems done by staff reviewed and pertinent positives include Weakness, numbness, walking difficulty, neck pain, anxiety/depression. Current Outpatient Medications on File Prior to Visit   Medication Sig Dispense Refill    diazePAM (VALIUM) 2 MG tablet Take one tab before MRI and may rpt x 1 in 15 min if needed; no driving after taking this medicaiton 2 tablet 0    Cholecalciferol (VITAMIN D3) 9412075 UNIT/GM LIQD 4 ml in AM, 2 ml in afternoon and 4 ml HS      Amphetamine (ADZENYS XR-ODT PO) Take by mouth      PROAIR  (90 BASE) MCG/ACT inhaler       HYDROcodone-acetaminophen (NORCO) 5-325 MG per tablet       lamoTRIgine (LAMICTAL) 100 MG tablet Take 100 mg by mouth daily       butalbital-acetaminophen-caffeine (FIORICET, ESGIC) per tablet Take 1 tablet by mouth as needed for Pain.  Fluticasone Propionate  HFA (FLOVENT HFA IN) Inhale  into the lungs. No current facility-administered medications on file prior to visit.      Allergies: Sarahi Conklin is allergic to latex, red dye, scopolamine, asa [aspirin], brexpiprazole, duloxetine, naproxen, nitrofurantoin, and other.    Past Medical History:   Diagnosis Date    Abnormal Pap smear     unsure when  or what    Abnormal Pap smear of cervix 10/21/15     LSIL (-)HPV    Anxiety     Asthma     Depression     Migraine     Multiple sclerosis (HCC)     Post-traumatic stress syndrome        Past Surgical History:   Procedure Laterality Date    COLPOSCOPY      CORNEAL TRANSPLANT Bilateral     EYE SURGERY      stye removal    TEAR DUCT SURGERY Bilateral     WISDOM TOOTH EXTRACTION       Social History: Ritika Encarnacion  reports that she has never smoked. She has never used smokeless tobacco. She reports current alcohol use. She reports that she does not use drugs. Family History   Problem Relation Age of Onset   [facility] Cancer Mother         bile duct with mets everywhere    Diabetes Mother     High Blood Pressure Mother     Clotting Disorder Mother     Heart Disease Father         CHF    Diabetes Father     COPD Father     Clotting Disorder Father     Breast Cancer Maternal Grandmother     High Blood Pressure Maternal Grandmother     Tuberculosis Paternal Grandmother     Cancer Paternal Grandfather     Heart Disease Paternal Grandfather         CHF    COPD Maternal Grandfather      On exam: Vitals: Blood pressure 114/77, pulse 87, height 5' 3\" (1.6 m), weight 109 lb (49.4 kg), not currently breastfeeding. NEUROLOGIC EXAMINATION  GENERAL  Appears comfortable and in no distress   HEENT  NC/ AT   NECK  Supple and no bruits heard   MENTAL STATUS:  Alert, oriented, intact memory, no confusion, normal speech, normal language, no hallucination or delusion; appropriate affect   CRANIAL NERVES: II     -      PERRLA, decreased visual acuity bilaterally; Fundi reveal intact venous pulsations;   Visual fields intact to confrontation  III,IV,VI -  EOMs full, no afferent defect, no STEVEN, no ptosis  V     -     Diminished LT, PP on rt face   VII    -     Normal facial symmetry  VIII   -     Intact hearing  IX,X - Symmetrical palate  XI    -     Symmetrical shoulder shrug  XII   -     Midline tongue, no atrophy    MOTOR FUNCTION:  significant for good strength of grade 5/5 in bilateral proximal and distal muscle groups of both upper and lower extremities with normal bulk, normal tone and no involuntary movements, no tremor   SENSORY FUNCTION:  impaired LT, PP, Temp and vibration in rt side of face, arm and leg   CEREBELLAR FUNCTION:  severe dysmetria bilaterally; Rt >> Lt    REFLEX FUNCTION:  Symmetric, no perverted reflex, equivocal on left, flexor on right   STATION and GAIT  needed help for stance and could not walk without 2 person assist; wheel chair bound       Lab Results   Component Value Date    WBC 4.3 11/07/2021    HGB 14.4 11/07/2021    HCT 43.7 11/07/2021    MCV 95.2 11/07/2021     11/07/2021    LYMPHOPCT 45 (H) 11/07/2021    RBC 4.59 11/07/2021    MCH 31.4 11/07/2021    MCHC 33.0 11/07/2021    RDW 11.9 11/07/2021         Diagnostic data reviewed with the patient:    Neuroimaging studies done at [facility] reviewed:    Cervical spine MRI (with/without) 2/1/2021: High signal foci throughout cervical spinal cord consistent with multiple sclerosis. No associated enhancement. Brain MRI (with/without) 1/31/2021: Extensive periventricular and deep cerebral white matter signal abnormalities; patchy T2/flair hyperintense signal abnormalities in brainstem and medial thalami which were not seen on prior study. No contrast-enhancement. MRI brain (with/without) 11/10/2021: Innumerable prominent demyelinating plaques in brain consistent with multiple sclerosis; none demonstrate enhancement to indicate active demyelination. MRI cervical spine (with/without) 11/10/2021: Multiple foci of cord signal abnormality at C2, C4, C5-C6 and C7-T1 suggestive of demyelinating lesion. MRI thoracic spine with and without contrast (11/10/2021):  Innumerable small T2 hyperintense foci in the cord consistent with multiple including IV ocrelizumab or, natalizumab, Alemtuzumab, cladribine, etc. She did not want to go for Aleumtuzumab or cladribine. She was inclining towards iv ocrelizumab. Patient and her significant other verbalized understanding those instructions. MIREYA Virus testing result pending. Also to start process for DMT. Exertional tremor; to start Primidone; med counseling done. Follow up in 4 wks or sooner. This note was partially created using voice recognition software and is inherently subject to errors including those of syntax and \"sound alike\" substitutions which may escape proofreading. In such instances, original meaning may be extrapolated by contextual derivation.

## 2021-11-25 ENCOUNTER — APPOINTMENT (OUTPATIENT)
Dept: GENERAL RADIOLOGY | Facility: CLINIC | Age: 46
End: 2021-11-25
Payer: COMMERCIAL

## 2021-11-25 ENCOUNTER — HOSPITAL ENCOUNTER (EMERGENCY)
Facility: CLINIC | Age: 46
Discharge: HOME | End: 2021-11-25
Attending: EMERGENCY MEDICINE
Payer: COMMERCIAL

## 2021-11-25 DIAGNOSIS — S40.021A CONTUSION OF MULTIPLE SITES OF RIGHT SHOULDER AND UPPER ARM, INITIAL ENCOUNTER: Primary | ICD-10-CM

## 2021-11-25 DIAGNOSIS — S60.211A CONTUSION OF RIGHT WRIST, INITIAL ENCOUNTER: ICD-10-CM

## 2021-11-25 DIAGNOSIS — S40.011A CONTUSION OF MULTIPLE SITES OF RIGHT SHOULDER AND UPPER ARM, INITIAL ENCOUNTER: Primary | ICD-10-CM

## 2021-11-25 PROCEDURE — 6370000000 HC RX 637 (ALT 250 FOR IP): Performed by: EMERGENCY MEDICINE

## 2021-11-25 PROCEDURE — 99284 EMERGENCY DEPT VISIT MOD MDM: CPT

## 2021-11-25 PROCEDURE — 73110 X-RAY EXAM OF WRIST: CPT

## 2021-11-25 RX ORDER — IBUPROFEN 200 MG
400 TABLET ORAL ONCE
Status: COMPLETED | OUTPATIENT
Start: 2021-11-25 | End: 2021-11-25

## 2021-11-25 RX ADMIN — IBUPROFEN 400 MG: 200 TABLET, FILM COATED ORAL at 20:38

## 2021-11-25 ASSESSMENT — PAIN DESCRIPTION - PAIN TYPE: TYPE: ACUTE PAIN

## 2021-11-25 ASSESSMENT — PAIN DESCRIPTION - LOCATION: LOCATION: SHOULDER;WRIST

## 2021-11-25 ASSESSMENT — PAIN SCALES - GENERAL
PAINLEVEL_OUTOF10: 0
PAINLEVEL_OUTOF10: 2
PAINLEVEL_OUTOF10: 2

## 2021-11-25 ASSESSMENT — ENCOUNTER SYMPTOMS
GASTROINTESTINAL NEGATIVE: 1
ALLERGIC/IMMUNOLOGIC NEGATIVE: 1
RESPIRATORY NEGATIVE: 1
EYES NEGATIVE: 1

## 2021-11-25 ASSESSMENT — PAIN DESCRIPTION - FREQUENCY: FREQUENCY: CONTINUOUS

## 2021-11-25 ASSESSMENT — PAIN DESCRIPTION - ORIENTATION: ORIENTATION: RIGHT

## 2021-11-25 ASSESSMENT — PAIN DESCRIPTION - DESCRIPTORS: DESCRIPTORS: ACHING

## 2021-11-26 VITALS
DIASTOLIC BLOOD PRESSURE: 89 MMHG | OXYGEN SATURATION: 99 % | HEART RATE: 84 BPM | RESPIRATION RATE: 15 BRPM | WEIGHT: 109 LBS | TEMPERATURE: 97.9 F | HEIGHT: 62 IN | BODY MASS INDEX: 20.06 KG/M2 | SYSTOLIC BLOOD PRESSURE: 133 MMHG

## 2021-11-26 NOTE — ED NOTES
Pt presents to the ED via private auto accompanied by her SO. Pt states she fell over a chair from a standing position earlier this am landing on her right shoulder/wrist. Pt c/o pain in both.  Pt has MS and is normally in a wheelchair      Kai Lakonrad, [facility]  11/25/21 2026

## 2021-11-26 NOTE — ED PROVIDER NOTES
eMERGENCY dEPARTMENT eNCOUnter      Pt Name: Chantale Emanuel  MRN: [id number]  [place] 1975  Date of evaluation: 11/25/2021      CHIEF COMPLAINT       Chief Complaint   Patient presents with    Shoulder Pain     right    Wrist Pain          HISTORY OF PRESENT ILLNESS    Chantale Emanuel is a 40 y.o. female who presents to the emergency department after having had a fall from a chair and standing position earlier this morning. She landed on her right wrist and shoulder. Basically has complaints of pain in her right wrist she says the pain kind of radiates up to her shoulder but she has full range of motion of that right shoulder and is not complaining of shoulder pain to me when I examined her. There is no obvious deformity on her wrist there is some mild erythema that is noted but no swelling. She has had no head or neck injury. REVIEW OF SYSTEMS         Review of Systems   Constitutional: Negative. HENT: Negative. Eyes: Negative. Respiratory: Negative. Cardiovascular: Negative. Gastrointestinal: Negative. Endocrine: Negative. Genitourinary: Negative. Musculoskeletal: Positive for arthralgias. Skin: Negative. Allergic/Immunologic: Negative. Neurological: Negative. Hematological: Negative. Psychiatric/Behavioral: Negative. PAST MEDICAL HISTORY    has a past medical history of Abnormal Pap smear, Abnormal Pap smear of cervix, Anxiety, Asthma, Depression, Migraine, Multiple sclerosis ([facility] [address] and Post-traumatic stress syndrome. SURGICAL HISTORY      has a past surgical history that includes Colposcopy; [place] tooth extraction; Corneal transplant (Bilateral); Tear duct surgery (Bilateral); Eye surgery; and Hysterectomy. CURRENT MEDICATIONS       Previous Medications    ALPRAZOLAM (XANAX) 0.5 MG TABLET    Take 0.5 mg by mouth nightly as needed.      AMPHETAMINE (ADZENYS XR-ODT PO)    Take by mouth    BUTALBITAL-ACETAMINOPHEN-CAFFEINE (FIORICET, ESGIC) PER TABLET    Take 1 tablet by mouth as needed for Pain. CHOLECALCIFEROL (VITAMIN D3) [id number] UNIT/GM LIQD    4 ml in AM, 2 ml in afternoon and 4 ml HS    DIAZEPAM (VALIUM) 2 MG TABLET    Take one tab before MRI and may rpt x 1 in 15 min if needed; no driving after taking this medicaiton    FLUTICASONE PROPIONATE  HFA (FLOVENT HFA IN)    Inhale  into the lungs. HYDROCODONE-ACETAMINOPHEN (NORCO) 5-325 MG PER TABLET        LAMOTRIGINE (LAMICTAL) 100 MG TABLET    Take 100 mg by mouth daily     PRIMIDONE (MYSOLINE) 50 MG TABLET    Take one tab nightly    PROAIR  (90 BASE) MCG/ACT INHALER           ALLERGIES     is allergic to latex, red dye, scopolamine, asa [aspirin], brexpiprazole, duloxetine, naproxen, nitrofurantoin, and other. FAMILY HISTORY     She indicated that her mother is . She indicated that her father is alive. She indicated that her maternal grandmother is . She indicated that the status of her maternal grandfather is unknown. She indicated that her paternal grandmother is . She indicated that her paternal grandfather is . family history includes Breast Cancer in her maternal grandmother; COPD in her father and maternal grandfather; Cancer in her mother and paternal grandfather; Clotting Disorder in her father and mother; Diabetes in her father and mother; Heart Disease in her father and paternal grandfather; High Blood Pressure in her maternal grandmother and mother; Tuberculosis in her paternal grandmother. SOCIAL HISTORY      reports that she has never smoked. She has never used smokeless tobacco. She reports current alcohol use. She reports that she does not use drugs. PHYSICAL EXAM     INITIAL VITALS:  height is 5' 2\" (1.575 m) and weight is 49.4 kg (109 lb). Her oral temperature is 97.9 °F (36.6 °C). Her blood pressure is 146/91 (abnormal) and her pulse is 89. Her respiration is 15 and oxygen saturation is 99%.      Constitutional: Alert, oriented x3, nontoxic, afebrile, answering questions appropriately, acting properly for age, in no acute distress  HEENT: Extraocular muscles intact, mucus membranes moist, TMs clear bilaterally, no posterior pharyngeal erythema or exudates, Pupils equal, round, reactive to light,   Neck: Trachea midline, Supple without lymphadenopathy, no posterior midline neck tenderness to palpation  Cardiovascular: Regular rhythm and rate no S3, S4, or murmurs  Respiratory: Clear to auscultation bilaterally no wheezes, rhonchi, rales, no respiratory distress  Gastrointestinal: Soft, nontender, nondistended, positive bowel sounds. No rebound, rigidity, or guarding. Musculoskeletal: No extremity pain or swelling  Neurologic: Moving all 4 extremities without difficulty there are no gross focal neurologic deficits  Skin: Warm and dry      DIFFERENTIAL DIAGNOSIS/ MDM:     Contusion of right wrist versus fracture patient will get an x-ray and some nonsteroidal anti-inflammatory medicine and be reevaluated. DIAGNOSTIC RESULTS     EKG: All EKG's are interpreted by the Emergency Department Physician who either signs or Co-signs this chart in the absence of a cardiologist.        Not indicated unless otherwise documented above    LABS:  No results found for this visit on 11/25/21. Not indicated unless otherwise documented above    RADIOLOGY:   I reviewed the radiologist interpretations:  XR WRIST RIGHT (MIN 3 VIEWS)   Final Result   1. No acute fracture identified.              Not indicated unless otherwise documented above    EMERGENCY DEPARTMENT COURSE:     The patient was given the following medications:  Orders Placed This Encounter   Medications    ibuprofen (ADVIL;MOTRIN) tablet 400 mg        Vitals:    Vitals:    11/25/21 2013   BP: (!) 146/91   Pulse: 89   Resp: 15   Temp: 97.9 °F (36.6 °C)   TempSrc: Oral   SpO2: 99%   Weight: 49.4 kg (109 lb)   Height: 5' 2\" (1.575 m)     -------------------------  BP (!) 146/91 Pulse 89   Temp 97.9 °F (36.6 °C) (Oral)   Resp 15   Ht 5' 2\" (1.575 m)   Wt 49.4 kg (109 lb)   LMP 11/27/2015 (Exact Date)   SpO2 99%   BMI 19.94 kg/m²         I have reviewed the disposition diagnosis with the patient and or their family/guardian. I have answered their questions and given discharge instructions. They voiced understanding of these instructions and did not have any further questions or complaints. CRITICAL CARE:    None    CONSULTS:    None    PROCEDURES:    None      OARRS Report if indicated             FINAL IMPRESSION      1. Contusion of multiple sites of right shoulder and upper arm, initial encounter    2. Contusion of right wrist, initial encounter          DISPOSITION/PLAN   DISPOSITION Decision To Discharge    I have reviewed the disposition diagnosis with the patient and or their family/guardian. I have answered their questions and given discharge instructions. They voiced understanding of these instructions and did not have any further questions or complaints. Reevaluation: Patient's x-ray is negative for any acute bony injury, she has gotten ibuprofen for her pain. She has a contusion I believe the patient is stable for discharge home to follow-up with her own physician.       PATIENT REFERRED TO:  Janine Zarate, APRN - CNP  [address]  [phone]    In 1 day        DISCHARGE MEDICATIONS:  New Prescriptions    No medications on file       (Please note that portions of this note were completed with a voice recognition program.  Efforts were made to edit the dictations but occasionally words are mis-transcribed.)    Chasity Kemp MD,, MD  Attending Emergency Physician            Chasity Kemp MD  11/25/21 7754

## 2021-12-01 ENCOUNTER — HOSPITAL ENCOUNTER (OUTPATIENT)
Dept: INFUSION THERAPY | Age: 46
Discharge: HOME | End: 2021-12-01
Payer: COMMERCIAL

## 2021-12-01 VITALS
SYSTOLIC BLOOD PRESSURE: 134 MMHG | TEMPERATURE: 97.7 F | RESPIRATION RATE: 18 BRPM | DIASTOLIC BLOOD PRESSURE: 73 MMHG | HEART RATE: 94 BPM

## 2021-12-01 DIAGNOSIS — G35 MULTIPLE SCLEROSIS, RELAPSING-REMITTING (HCC): Primary | ICD-10-CM

## 2021-12-01 PROCEDURE — 2580000003 HC RX 258: Performed by: PSYCHIATRY & NEUROLOGY

## 2021-12-01 PROCEDURE — 6360000002 HC RX W HCPCS: Performed by: PSYCHIATRY & NEUROLOGY

## 2021-12-01 PROCEDURE — 96365 THER/PROPH/DIAG IV INF INIT: CPT

## 2021-12-01 RX ORDER — SODIUM CHLORIDE 9 MG/ML
25 INJECTION, SOLUTION INTRAVENOUS PRN
Status: CANCELLED | OUTPATIENT
Start: 2021-12-02

## 2021-12-01 RX ORDER — HEPARIN SODIUM (PORCINE) LOCK FLUSH IV SOLN 100 UNIT/ML 100 UNIT/ML
500 SOLUTION INTRAVENOUS PRN
Status: CANCELLED | OUTPATIENT
Start: 2021-12-02

## 2021-12-01 RX ORDER — SODIUM CHLORIDE 0.9 % (FLUSH) 0.9 %
5-40 SYRINGE (ML) INJECTION PRN
Status: CANCELLED | OUTPATIENT
Start: 2021-12-02

## 2021-12-01 RX ADMIN — SODIUM CHLORIDE 1000 MG: 9 INJECTION, SOLUTION INTRAVENOUS at 15:08

## 2021-12-01 NOTE — PLAN OF CARE
Problem: KNOWLEDGE DEFICIT  Goal: Patient/S.O. demonstrates understanding of disease process, treatment plan, medications, and discharge instructions.   12/1/2021 1517 by Robb Castillo, RN  Outcome: Met This Shift  12/1/2021 1517 by Robb Castillo, RN  Outcome: Met This Shift

## 2021-12-02 ENCOUNTER — HOSPITAL ENCOUNTER (OUTPATIENT)
Dept: INFUSION THERAPY | Age: 46
Discharge: HOME | End: 2021-12-02
Payer: COMMERCIAL

## 2021-12-02 VITALS
DIASTOLIC BLOOD PRESSURE: 72 MMHG | TEMPERATURE: 98.5 F | RESPIRATION RATE: 18 BRPM | SYSTOLIC BLOOD PRESSURE: 121 MMHG | HEART RATE: 102 BPM

## 2021-12-02 DIAGNOSIS — G35 MULTIPLE SCLEROSIS, RELAPSING-REMITTING (HCC): Primary | ICD-10-CM

## 2021-12-02 PROCEDURE — 6360000002 HC RX W HCPCS: Performed by: PSYCHIATRY & NEUROLOGY

## 2021-12-02 PROCEDURE — 2580000003 HC RX 258: Performed by: PSYCHIATRY & NEUROLOGY

## 2021-12-02 PROCEDURE — 96365 THER/PROPH/DIAG IV INF INIT: CPT

## 2021-12-02 RX ORDER — SODIUM CHLORIDE 0.9 % (FLUSH) 0.9 %
5-40 SYRINGE (ML) INJECTION PRN
Status: CANCELLED | OUTPATIENT
Start: 2021-12-03

## 2021-12-02 RX ORDER — HEPARIN SODIUM (PORCINE) LOCK FLUSH IV SOLN 100 UNIT/ML 100 UNIT/ML
500 SOLUTION INTRAVENOUS PRN
Status: CANCELLED | OUTPATIENT
Start: 2021-12-03

## 2021-12-02 RX ORDER — SODIUM CHLORIDE 9 MG/ML
25 INJECTION, SOLUTION INTRAVENOUS PRN
Status: CANCELLED | OUTPATIENT
Start: 2021-12-03

## 2021-12-02 RX ADMIN — SODIUM CHLORIDE 1000 MG: 9 INJECTION, SOLUTION INTRAVENOUS at 14:59

## 2021-12-02 NOTE — PROGRESS NOTES
Patient here for 2 of 3 solu-medrol. Tolerated w/o incident. Left IV accessed. Left instable condition.

## 2021-12-03 ENCOUNTER — HOSPITAL ENCOUNTER (OUTPATIENT)
Dept: INFUSION THERAPY | Age: 46
Discharge: HOME | End: 2021-12-03
Payer: COMMERCIAL

## 2021-12-03 DIAGNOSIS — G35 MULTIPLE SCLEROSIS, RELAPSING-REMITTING (HCC): Primary | ICD-10-CM

## 2021-12-03 PROCEDURE — 6360000002 HC RX W HCPCS: Performed by: PSYCHIATRY & NEUROLOGY

## 2021-12-03 PROCEDURE — 2580000003 HC RX 258: Performed by: PSYCHIATRY & NEUROLOGY

## 2021-12-03 PROCEDURE — 96365 THER/PROPH/DIAG IV INF INIT: CPT

## 2021-12-03 RX ORDER — SODIUM CHLORIDE 9 MG/ML
25 INJECTION, SOLUTION INTRAVENOUS PRN
OUTPATIENT
Start: 2021-12-03

## 2021-12-03 RX ORDER — HEPARIN SODIUM (PORCINE) LOCK FLUSH IV SOLN 100 UNIT/ML 100 UNIT/ML
500 SOLUTION INTRAVENOUS PRN
OUTPATIENT
Start: 2021-12-03

## 2021-12-03 RX ORDER — SODIUM CHLORIDE 0.9 % (FLUSH) 0.9 %
5-40 SYRINGE (ML) INJECTION PRN
OUTPATIENT
Start: 2021-12-03

## 2021-12-03 RX ADMIN — SODIUM CHLORIDE 1000 MG: 9 INJECTION, SOLUTION INTRAVENOUS at 14:46

## 2021-12-12 ENCOUNTER — TELEPHONE (OUTPATIENT)
Dept: NEUROLOGY | Age: 46
End: 2021-12-12

## 2021-12-12 NOTE — TELEPHONE ENCOUNTER
Could you please call the lab and find out the results of MIREYA Virus testing. Please let the patient also know it.    Thank you.   -dr. Indio Mena

## 2021-12-12 NOTE — TELEPHONE ENCOUNTER
Please call the patient and find out about the plan and what the decided for IV/ PO medication for MS. We have discussed during last visit about different meds; highly effective therapeutic agents including IV ocrelizumab or, natalizumab, Alemtuzumab, cladribine, etc. As per my understanding; she was inclining towards iv ocrelizumab. I tried calling and no answer. Please call her and make sure that patient is on schedule for DMTs for MS.     Thank you.   -dr. Xiao Tran

## 2021-12-13 ENCOUNTER — TELEPHONE (OUTPATIENT)
Dept: NEUROLOGY | Age: 46
End: 2021-12-13

## 2021-12-13 NOTE — TELEPHONE ENCOUNTER
The patient called in and said they were misunderstood about the [address] lab. I sent them to [facility] and faxed the order over.

## 2021-12-13 NOTE — TELEPHONE ENCOUNTER
I called [facility] since she had the labs drawn with [facility]. They said that the  did not release that order so it did not ever get drawn, at least with [facility].

## 2021-12-13 NOTE — TELEPHONE ENCOUNTER
I tried calling [facility], they are closed at this time. I will reach out to them again during business hours.

## 2021-12-13 NOTE — TELEPHONE ENCOUNTER
Josselyn  Can you please look into the forms for Ocrevus approval process.   Thank you.   -dr. Dipak Burgos

## 2021-12-20 ENCOUNTER — HOSPITAL ENCOUNTER (EMERGENCY)
Facility: CLINIC | Age: 46
Discharge: HOME | End: 2021-12-20
Attending: SPECIALIST
Payer: COMMERCIAL

## 2021-12-20 ENCOUNTER — APPOINTMENT (OUTPATIENT)
Dept: GENERAL RADIOLOGY | Facility: CLINIC | Age: 46
End: 2021-12-20
Payer: COMMERCIAL

## 2021-12-20 VITALS
BODY MASS INDEX: 20.06 KG/M2 | OXYGEN SATURATION: 96 % | SYSTOLIC BLOOD PRESSURE: 119 MMHG | TEMPERATURE: 98.1 F | HEART RATE: 82 BPM | DIASTOLIC BLOOD PRESSURE: 87 MMHG | RESPIRATION RATE: 16 BRPM | HEIGHT: 62 IN | WEIGHT: 109 LBS

## 2021-12-20 DIAGNOSIS — S82.201A CLOSED FRACTURE OF RIGHT TIBIA AND FIBULA, INITIAL ENCOUNTER: Primary | ICD-10-CM

## 2021-12-20 DIAGNOSIS — S82.401A CLOSED FRACTURE OF RIGHT TIBIA AND FIBULA, INITIAL ENCOUNTER: Primary | ICD-10-CM

## 2021-12-20 PROCEDURE — 29505 APPLICATION LONG LEG SPLINT: CPT

## 2021-12-20 PROCEDURE — 6370000000 HC RX 637 (ALT 250 FOR IP): Performed by: SPECIALIST

## 2021-12-20 PROCEDURE — 73590 X-RAY EXAM OF LOWER LEG: CPT

## 2021-12-20 PROCEDURE — 99285 EMERGENCY DEPT VISIT HI MDM: CPT

## 2021-12-20 RX ORDER — IBUPROFEN 200 MG
400 TABLET ORAL ONCE
Status: COMPLETED | OUTPATIENT
Start: 2021-12-20 | End: 2021-12-20

## 2021-12-20 RX ORDER — HYDROCODONE BITARTRATE AND ACETAMINOPHEN 5; 325 MG/1; MG/1
1 TABLET ORAL EVERY 6 HOURS PRN
Qty: 15 TABLET | Refills: 0 | Status: SHIPPED | OUTPATIENT
Start: 2021-12-20 | End: 2021-12-25

## 2021-12-20 RX ORDER — HYDROCODONE BITARTRATE AND ACETAMINOPHEN 5; 325 MG/1; MG/1
1 TABLET ORAL ONCE
Status: COMPLETED | OUTPATIENT
Start: 2021-12-20 | End: 2021-12-20

## 2021-12-20 RX ADMIN — HYDROCODONE BITARTRATE AND ACETAMINOPHEN 1 TABLET: 5; 325 TABLET ORAL at 22:38

## 2021-12-20 RX ADMIN — IBUPROFEN 400 MG: 200 TABLET, FILM COATED ORAL at 21:35

## 2021-12-20 ASSESSMENT — PAIN DESCRIPTION - ONSET: ONSET: SUDDEN

## 2021-12-20 ASSESSMENT — PAIN DESCRIPTION - FREQUENCY: FREQUENCY: CONTINUOUS

## 2021-12-20 ASSESSMENT — PAIN SCALES - GENERAL
PAINLEVEL_OUTOF10: 4
PAINLEVEL_OUTOF10: 8

## 2021-12-20 ASSESSMENT — PAIN DESCRIPTION - LOCATION: LOCATION: LEG;ANKLE

## 2021-12-20 ASSESSMENT — PAIN DESCRIPTION - DESCRIPTORS: DESCRIPTORS: SHARP

## 2021-12-20 ASSESSMENT — PAIN DESCRIPTION - ORIENTATION: ORIENTATION: RIGHT

## 2021-12-21 ENCOUNTER — OFFICE VISIT (OUTPATIENT)
Dept: ORTHOPEDIC SURGERY | Age: 46
End: 2021-12-21
Payer: COMMERCIAL

## 2021-12-21 ENCOUNTER — HOSPITAL ENCOUNTER (OUTPATIENT)
Dept: CT IMAGING | Facility: CLINIC | Age: 46
Discharge: HOME | End: 2021-12-23
Payer: COMMERCIAL

## 2021-12-21 VITALS — BODY MASS INDEX: 20.06 KG/M2 | HEIGHT: 62 IN | WEIGHT: 109 LBS | RESPIRATION RATE: 12 BRPM

## 2021-12-21 DIAGNOSIS — S82.831A CLOSED FRACTURE OF DISTAL END OF RIGHT FIBULA, UNSPECIFIED FRACTURE MORPHOLOGY, INITIAL ENCOUNTER: ICD-10-CM

## 2021-12-21 DIAGNOSIS — M25.571 RIGHT ANKLE PAIN, UNSPECIFIED CHRONICITY: Primary | ICD-10-CM

## 2021-12-21 DIAGNOSIS — S82.201A CLOSED FRACTURE OF SHAFT OF RIGHT TIBIA, UNSPECIFIED FRACTURE MORPHOLOGY, INITIAL ENCOUNTER: ICD-10-CM

## 2021-12-21 DIAGNOSIS — Z91.89 AT HIGH RISK FOR POSTOPERATIVE COMPLICATIONS: ICD-10-CM

## 2021-12-21 DIAGNOSIS — S82.201A CLOSED FRACTURE OF SHAFT OF RIGHT TIBIA, UNSPECIFIED FRACTURE MORPHOLOGY, INITIAL ENCOUNTER: Primary | ICD-10-CM

## 2021-12-21 PROCEDURE — 73700 CT LOWER EXTREMITY W/O DYE: CPT

## 2021-12-21 PROCEDURE — 99204 OFFICE O/P NEW MOD 45 MIN: CPT | Performed by: ORTHOPAEDIC SURGERY

## 2021-12-21 NOTE — LETTER
[address] [place] [address] and Sports Medicine  [place] [address]  Phone: [phone]  Fax: [phone]    Leah Potter MD    December 21, 2021     Byron Garcia, ALEKSANDRA - [facility]  [facility] [id number]    Patient: Sarahi Conklin   MR Number: [id number]   YOB: 1975   Date of Visit: 12/21/2021       Dear Byron Garcia: Thank you for referring Nuria Serrano to me for evaluation/treatment. Below are the relevant portions of my assessment and plan of care. She has a right displaced spiral fracture of the distal tibia shaft with distal fibula fracture, sustained on 12/20/2021. Notably, she does have a complex past medical history, including a history of MS with chronic oral steroid use, and reports that while she is not \"active\", and does have right-sided weakness, she does ambulate on her own, using a walker and/or furniture for assistance. Notably, she has a complex past medical history. She has a history of MS and chronic oral steroid use (100 mg daily). We had a discussion today about the likely diagnosis and its natural history, physical exam and imaging findings, as well as various treatment options in detail. Surgically, we discussed a right tibial IM nail plus fibula ORIF. We discussed both surgical and nonsurgical treatment options. We discussed the anticipated postoperative course. We discussed the risks of wound healing, particularly in the setting of her steroid use, and did discuss that she is at high risk for complications. Given the fact that she does ambulate and put weight through her right foot, she is interested in proceeding with surgery in the near future, in order to help obtain [facility] faster osseous union, as well as more stability which will allow her to hold weight-bear on a much faster timeframe. Orders/referrals were placed as below at today's visit.      The patient will avoid pain provoking activity. The patient will avoid the routine use of NSAIDs to help prevent the risk of delayed healing. We discussed the risks of displacement. At today's visit, the patient was ordered DME as below. I also ordered physical therapy for the patient to help reinforce/teach the relevant weight bearing precautions, to help allow for safe transfers and early mobilization, as well as effectively utilize DME. Surgical booking paperwork was completed and submitted. We spoke about the risks/benefits/alternatives to a surgical intervention. They understand that the risks of surgery may include but are not limited to pain, infection, bleeding, blood clot, damage to soft tissue/vessel/nerve, future surgery, scarring/stiffness, decreased strength/weakness, cosmetic deformity, neuroma/neuritis/phantom pains, delayed soft tissue/bone healing, nonunion, malunion, failure of hardware/fixation/surgery, iatrogenic fracture/dislocation, damage to bone/joint(s), worsening of condition, recurrence, limb length discrepancy, avascular necrosis of bone, neurovascular compromise/compartment syndrome, tourniquet complications, failure of surgery, dissatisfaction with outcome, loss of limb, stroke, heart attack, pulmonary embolus, mental status change, anesthesia risks/reaction, and even death. They expressed verbal understanding of the risks and wish to proceed with surgical intervention. All questions were answered. No guarantees were made or implied. Informed consent was obtained. We also had a discussion about the risk of blood clot and thromboembolic events. The patient understands that there is an increased risk with surgery/immobilization, and understands nothing will completely eliminate the risk of DVT/PE's, and that any prophylactic medication does not substitute for early mobilization.  Given her risk profile, I have recommended the following strategy to decrease the risk of blood clots, and the patient agrees and wishes to proceed:  Eliquis 2.5 mg PO q BID. All questions were answered and the above plan was agreed upon. The patient will return to clinic postoperatively . If you have questions, please do not hesitate to call me. I look forward to following Sabine Hart along with you.     Sincerely,      Slime Mar MD

## 2021-12-21 NOTE — ED TRIAGE NOTES
Pt to ED from home with c/o fall, leg injury, and leg pain. Fall occurred at [facility].  Pt reports losing balance and falling. Pt has history of Multiple Sclerosis and falls. Pt currently on steroid infusion. Denies hitting head, losing consciousness, or dizziness. Pt unvaccinated. Pt alert and oriented. Pt is weak and gait impaired. Right leg pain.

## 2021-12-21 NOTE — PROGRESS NOTES
[facility]  [place] [address]  Dept: [phone]    Ambulatory Orthopedic Consult      CHIEF COMPLAINT:    Chief Complaint   Patient presents with    Ankle Pain     right       HISTORY OF PRESENT ILLNESS:      The patient is a 40 y.o. female who is being seen for evaluation of the above, which began 12/20/2021 secondary to a fall from standing height  . At today's visit, she is using a splint/cast.     History is obtained today from:   [x]  the patient     [x]  EMR     [x]  one family member/friend    []  multiple family members/friends    []  other:          REVIEW OF SYSTEMS:  Musculoskeletal: See HPI for pertinent positives     Past Medical History:    She  has a past medical history of Abnormal Pap smear, Abnormal Pap smear of cervix (10/21/15 ), Anxiety, Asthma, Depression, Migraine, Multiple sclerosis ([facility] [address] and Post-traumatic stress syndrome. Past Surgical History:    She  has a past surgical history that includes Colposcopy; [place] tooth extraction; Corneal transplant (Bilateral); Tear duct surgery (Bilateral); Eye surgery; and Hysterectomy. Current Medications:     Current Outpatient Medications:     HYDROcodone-acetaminophen (NORCO) 5-325 MG per tablet, Take 1 tablet by mouth every 6 hours as needed for Pain for up to 5 days. , Disp: 15 tablet, Rfl: 0    ALPRAZolam (XANAX) 0.5 MG tablet, Take 0.5 mg by mouth nightly as needed.  , Disp: , Rfl:     primidone (MYSOLINE) 50 MG tablet, Take one tab nightly, Disp: 90 tablet, Rfl: 1    Cholecalciferol (VITAMIN D3) [id number] UNIT/GM LIQD, 4 ml in AM, 2 ml in afternoon and 4 ml HS, Disp: , Rfl:     Amphetamine (ADZENYS XR-ODT PO), Take by mouth, Disp: , Rfl:     PROAIR  (90 BASE) MCG/ACT inhaler, , Disp: , Rfl:     HYDROcodone-acetaminophen (NORCO) 5-325 MG per tablet, , Disp: , Rfl:     lamoTRIgine (LAMICTAL) 100 MG tablet, Take 100 mg by mouth daily , Disp: , Rfl:     butalbital-acetaminophen-caffeine (FIORICET, ESGIC) per tablet, Take 1 tablet by mouth as needed for Pain., Disp: , Rfl:     Fluticasone Propionate  HFA (FLOVENT HFA IN), Inhale  into the lungs. , Disp: , Rfl:      Allergies:    Latex, Red dye, Scopolamine, Asa [aspirin], Brexpiprazole, Duloxetine, Naproxen, Nitrofurantoin, and Other    Family History:  family history includes Breast Cancer in her maternal grandmother; COPD in her father and maternal grandfather; Cancer in her mother and paternal grandfather; Clotting Disorder in her father and mother; Diabetes in her father and mother; Heart Disease in her father and paternal grandfather; High Blood Pressure in her maternal grandmother and mother; Tuberculosis in her paternal grandmother. Social History:   Social History     Occupational History    Not on file   Tobacco Use    Smoking status: Never Smoker    Smokeless tobacco: Never Used   Vaping Use    Vaping Use: Never used   Substance and Sexual Activity    Alcohol use: Yes     Comment: social    Drug use: No    Sexual activity: Not Currently     Disabled    OBJECTIVE:  Resp 12   Ht 5' 2\" (1.575 m)   Wt 109 lb (49.4 kg)   LMP 11/27/2015 (Exact Date)   BMI 19.94 kg/m²    Psych: awake, alert  Cardio:  well perfused extremities, no cyanosis  Resp:  normal respiratory effort  Musculoskeletal:    Affected lower extremity:    Vascular: Limb well perfused, compartments soft/compressible. Skin: No erythema/ulcers. Intact.    Neurovascular Status:  Grossly neurovascularly intact throughout  Motion:  Grossly able to fire major muscle groups with appropriate/expected AROM  Tenderness to Palpation: Diffusely throughout the leg  -      RADIOLOGY:   12/21/2021 FINDINGS:  Three views (AP, Mortise, and Lateral) of the right ankle and 2 views (AP, Lateral) of the right tibia were obtained in the office today and reviewed, revealing displaced spiral fracture of the distal tibia shaft with distal fibula fracture. IMPRESSION: Osseous injury as above. Electronically signed by Harriet Chen MD      Relevant previous imaging reviewed, both imaging and report(s) as below:    XR TIBIA FIBULA RIGHT (2 VIEWS)    Result Date: 12/20/2021  Acute minimally displaced distal shaft tibia fracture. Acute nondisplaced distal fibular fracture. Soft tissue swelling. No dislocation. ASSESSMENT AND PLAN:  Body mass index is 19.94 kg/m². She has a right displaced spiral fracture of the distal tibia shaft with distal fibula fracture, sustained on 12/20/2021. Notably, she does have a complex past medical history, including a history of MS with chronic oral steroid use, and reports that while she is not \"active\", and does have right-sided weakness, she does ambulate on her own, using a walker and/or furniture for assistance. Notably, she has a complex past medical history. She has a history of MS and chronic oral steroid use (100 mg daily). We had a discussion today about the likely diagnosis and its natural history, physical exam and imaging findings, as well as various treatment options in detail. Surgically, we discussed a right tibial IM nail plus fibula ORIF. We discussed both surgical and nonsurgical treatment options. We discussed the anticipated postoperative course. We discussed the risks of wound healing, particularly in the setting of her steroid use, and did discuss that she is at high risk for complications. Given the fact that she does ambulate and put weight through her right foot, she is interested in proceeding with surgery in the near future, in order to help obtain [facility] faster osseous union, as well as more stability which will allow her to hold weight-bear on a much faster timeframe. Orders/referrals were placed as below at today's visit. The patient will avoid pain provoking activity.   The patient will avoid the routine use of NSAIDs to help prevent the risk of delayed healing. We discussed the risks of displacement. At today's visit, the patient was ordered DME as below. I also ordered physical therapy for the patient to help reinforce/teach the relevant weight bearing precautions, to help allow for safe transfers and early mobilization, as well as effectively utilize DME. Surgical booking paperwork was completed and submitted. We spoke about the risks/benefits/alternatives to a surgical intervention. They understand that the risks of surgery may include but are not limited to pain, infection, bleeding, blood clot, damage to soft tissue/vessel/nerve, future surgery, scarring/stiffness, decreased strength/weakness, cosmetic deformity, neuroma/neuritis/phantom pains, delayed soft tissue/bone healing, nonunion, malunion, failure of hardware/fixation/surgery, iatrogenic fracture/dislocation, damage to bone/joint(s), worsening of condition, recurrence, limb length discrepancy, avascular necrosis of bone, neurovascular compromise/compartment syndrome, tourniquet complications, failure of surgery, dissatisfaction with outcome, loss of limb, stroke, heart attack, pulmonary embolus, mental status change, anesthesia risks/reaction, and even death. They expressed verbal understanding of the risks and wish to proceed with surgical intervention. All questions were answered. No guarantees were made or implied. Informed consent was obtained. We also had a discussion about the risk of blood clot and thromboembolic events. The patient understands that there is an increased risk with surgery/immobilization, and understands nothing will completely eliminate the risk of DVT/PE's, and that any prophylactic medication does not substitute for early mobilization. Given her risk profile, I have recommended the following strategy to decrease the risk of blood clots, and the patient agrees and wishes to proceed:  Eliquis 2.5 mg PO q BID.      All questions were answered and the errors including those of syntax and sound-a-like substitutions which may escape proof reading. In such instances, actual meaning can be extrapolated by context.

## 2021-12-21 NOTE — ED PROVIDER NOTES
[facility]  [facility]  Phone: [phone]      Pt Name: Dana Lara  MRN: [id number]  [place] 1975  Date of evaluation: 12/20/2021      CHIEF COMPLAINT       Chief Complaint   Patient presents with    Leg Pain     Right    Fall    Leg Injury         HISTORY OF PRESENT ILLNESS    Dana Lara is a 40 y.o. female who presents   Chief Complaint   Patient presents with    Leg Pain     Right    Fall    Leg Injury   . 73-year-old female patient presents to the emergency department accompanied by his significant other for evaluation of right lower leg injury status post fall at about 4 PM prior to arrival.  Patient has history of multiple sclerosis and has difficulty ambulating. She has had recent flareup about 2 weeks ago and required steroid infusion for 3 days. Her right leg gave out causing her to lose the balance and fall. She denies sustaining head injury or loss of consciousness. She denies any headache, neck pain, upper or lower back pain or flank pain. She also denies any chest or abdominal injuries. She denies any tingling, numbness or weakness distally. She does not take any anticoagulants or antiplatelet agents. No history of diabetes mellitus. She denies any previous injuries to the right lower leg. REVIEW OF SYSTEMS       Review of Systems    All systems reviewed and negative unless noted in HPI. The patient denies fever or constitutional symptoms. Denies vision change. Denies any sore throat or rhinorrhea. Denies any neck pain or stiffness. Denies chest pain or shortness of breath. No nausea,  vomiting or diarrhea. Denies any dysuria. Denies urinary frequency or hematuria. Denies any weakness, numbness or focal neurologic deficit. Denies any skin rash or edema. No recent psychiatric issues. No easy bruising or bleeding.    Denies any polyuria, polydypsia       PAST MEDICAL HISTORY    has a past medical history of Abnormal Pap smear, Abnormal Pap smear of cervix, Anxiety, Asthma, Depression, Migraine, Multiple sclerosis ([facility] [address] and Post-traumatic stress syndrome. SURGICAL HISTORY      has a past surgical history that includes Colposcopy; [place] tooth extraction; Corneal transplant (Bilateral); Tear duct surgery (Bilateral); Eye surgery; and Hysterectomy. CURRENT MEDICATIONS       Discharge Medication List as of 2021 10:49 PM      CONTINUE these medications which have NOT CHANGED    Details   ALPRAZolam (XANAX) 0.5 MG tablet Take 0.5 mg by mouth nightly as needed. Historical Med      primidone (MYSOLINE) 50 MG tablet Take one tab nightly, Disp-90 tablet, R-1Normal      Cholecalciferol (VITAMIN D3) [id number] UNIT/GM LIQD 4 ml in AM, 2 ml in afternoon and 4 ml HSHistorical Med      Amphetamine (ADZENYS XR-ODT PO) Take by mouthHistorical Med      PROAIR  (90 BASE) MCG/ACT inhaler [facility]      !! HYDROcodone-acetaminophen (NORCO) 5-325 MG per tablet Historical Med      lamoTRIgine (LAMICTAL) 100 MG tablet Take 100 mg by mouth daily Historical Med      butalbital-acetaminophen-caffeine (FIORICET, ESGIC) per tablet Take 1 tablet by mouth as needed for Pain. Fluticasone Propionate  HFA (FLOVENT HFA IN) Inhale  into the lungs. !! - Potential duplicate medications found. Please discuss with provider. ALLERGIES     is allergic to latex, red dye, scopolamine, asa [aspirin], brexpiprazole, duloxetine, naproxen, nitrofurantoin, and other. FAMILY HISTORY     She indicated that her mother is . She indicated that her father is alive. She indicated that her maternal grandmother is . She indicated that the status of her maternal grandfather is unknown. She indicated that her paternal grandmother is . She indicated that her paternal grandfather is .      family history includes Breast Cancer in her maternal grandmother; COPD in her father and maternal grandfather; Cancer in her mother and paternal grandfather; Clotting Disorder in her father and mother; Diabetes in her father and mother; Heart Disease in her father and paternal grandfather; High Blood Pressure in her maternal grandmother and mother; Tuberculosis in her paternal grandmother. SOCIAL HISTORY      reports that she has never smoked. She has never used smokeless tobacco. She reports current alcohol use. She reports that she does not use drugs. PHYSICAL EXAM     INITIAL VITALS:  height is 5' 2\" (1.575 m) and weight is 49.4 kg (109 lb). Her oral temperature is 98.1 °F (36.7 °C). Her blood pressure is 119/87 and her pulse is 82. Her respiration is 16 and oxygen saturation is 96%. Physical Exam  Vitals and nursing note reviewed. Constitutional:       Appearance: She is well-developed. HENT:      Head: Normocephalic and atraumatic. Nose: Nose normal.   Eyes:      Extraocular Movements: Extraocular movements intact. Pupils: Pupils are equal, round, and reactive to light. Cardiovascular:      Rate and Rhythm: Normal rate and regular rhythm. Heart sounds: Normal heart sounds. No murmur heard. Pulmonary:      Effort: Pulmonary effort is normal. No respiratory distress. Breath sounds: Normal breath sounds. Abdominal:      General: Bowel sounds are normal. There is no distension. Palpations: Abdomen is soft. Tenderness: There is no abdominal tenderness. Musculoskeletal:      Cervical back: Normal range of motion and neck supple. Right lower leg: Swelling and tenderness present. Skin:     General: Skin is warm and dry. Neurological:      Mental Status: She is alert and oriented to person, place, and time.            DIFFERENTIAL DIAGNOSIS/ MDM:     Fracture, contusion, sprain    DIAGNOSTIC RESULTS     EKG: All EKG's are interpreted by the Emergency Department Physician who either signs or Co-signs this chart in the absence of a cardiologist.    None obtained    RADIOLOGY:   I reviewed the radiologist interpretations:  XR TIBIA FIBULA RIGHT (2 VIEWS)   Final Result   Acute minimally displaced distal shaft tibia fracture. Acute nondisplaced   distal fibular fracture. Soft tissue swelling. No dislocation. XR TIBIA FIBULA RIGHT (2 VIEWS)    Result Date: 12/20/2021  EXAMINATION: XRAY VIEWS OF THE RIGHT TIBIA AND FIBULA 12/20/2021 8:19 pm COMPARISON: None. HISTORY: ORDERING SYSTEM PROVIDED HISTORY: Fall TECHNOLOGIST PROVIDED HISTORY: Fall Reason for Exam: Right lower leg pain s/p fall FINDINGS: Frontal and lateral view radiographs of the right tibia and fibula were obtained. Bone mineralization is normal.  There is an acute minimally displaced obliquely oriented fracture of the distal tibial shaft. There is also an acute nondisplaced obliquely oriented fracture of the distal fibula at the level of the syndesmosis. Proximal and distal joint relationships are maintained. No significant degenerative findings. Mild diffuse soft tissue swelling is noted. Acute minimally displaced distal shaft tibia fracture. Acute nondisplaced distal fibular fracture. Soft tissue swelling. No dislocation. LABS:  Labs Reviewed - No data to display      EMERGENCY DEPARTMENT COURSE:   Vitals:    Vitals:    12/20/21 2052 12/20/21 2234 12/20/21 2241   BP: (!) 143/92 (!) 140/87 119/87   Pulse: 78 82    Resp: 12 16    Temp: 98.1 °F (36.7 °C)     TempSrc: Oral     SpO2: 99% 98% 96%   Weight: 49.4 kg (109 lb)     Height: 5' 2\" (1.575 m)       -------------------------  BP: 119/87, Temp: 98.1 °F (36.7 °C), Pulse: 82, Resp: 16    Orders Placed This Encounter   Medications    ibuprofen (ADVIL;MOTRIN) tablet 400 mg    HYDROcodone-acetaminophen (NORCO) 5-325 MG per tablet 1 tablet    HYDROcodone-acetaminophen (NORCO) 5-325 MG per tablet     Sig: Take 1 tablet by mouth every 6 hours as needed for Pain for up to 5 days.      Dispense:  15 tablet     Refill:  0 During the emergency department course, ice packs were applied locally and patient was given Motrin 400 mg as well as Norco 1 tablet orally. Posterior splint was applied to the right ankle and lower leg. Patient declined crutches. Her significant other will take care of her at home and she will follow-up with orthopedic surgeon. She was given referral to orthopedic surgeon on-call. She is advised to continue ice packs locally, elevate the right lower leg, take Tylenol and ibuprofen as needed for the pain, Norco for uncontrolled pain, call tomorrow morning for appointment, return if worse. I have reviewed the disposition diagnosis with the patient and or their family/guardian. I have answered their questions and given discharge instructions. They voiced understanding of these instructions and did not have any further questions or complaints. Re-evaluation Notes    Patient is resting comfortably and does not appear to be in any pain or distress prior to discharge      PROCEDURES:  Splint Application    Date/Time: 12/21/2021 5:28 AM  Performed by: Hernando Cummings MD  Authorized by: Hernando Cummings MD     Consent:     Consent obtained:  Verbal    Consent given by:  Patient    Risks discussed:  Pain, swelling and numbness    Alternatives discussed:  No treatment, observation and referral  Pre-procedure details:     Sensation:  Normal  Procedure details:     Laterality:  Right    Location:  Leg    Splint type:  Short leg    Supplies:  Cotton padding, elastic bandage and Ortho-Glass  Post-procedure details:     Pain:  Improved    Sensation:  Normal    Patient tolerance of procedure: Tolerated well, no immediate complications        FINAL IMPRESSION      1.  Closed fracture of right tibia and fibula, initial encounter          DISPOSITION/PLAN   DISPOSITION Decision To Discharge 12/20/2021 10:31:51 PM      Condition on Disposition    Stable    PATIENT REFERRED TO:  Swapna Novoa MD  [id number] [address] [facility]

## 2021-12-23 ENCOUNTER — HOSPITAL ENCOUNTER (OUTPATIENT)
Facility: CLINIC | Age: 46
Discharge: HOME | End: 2021-12-23
Payer: COMMERCIAL

## 2021-12-23 DIAGNOSIS — R76.8 JC VIRUS ANTIBODY POSITIVE: ICD-10-CM

## 2021-12-23 DIAGNOSIS — G35 EXACERBATION OF MULTIPLE SCLEROSIS (HCC): ICD-10-CM

## 2021-12-23 LAB
ABSOLUTE EOS #: 0.29 K/UL (ref 0–0.44)
ABSOLUTE IMMATURE GRANULOCYTE: <0.03 K/UL (ref 0–0.3)
ABSOLUTE LYMPH #: 2.61 K/UL (ref 1.1–3.7)
ABSOLUTE MONO #: 0.41 K/UL (ref 0.1–1.2)
ALBUMIN SERPL-MCNC: 4.2 G/DL (ref 3.5–5.2)
ALBUMIN/GLOBULIN RATIO: 1.4 (ref 1–2.5)
ALP BLD-CCNC: 49 U/L (ref 35–104)
ALT SERPL-CCNC: 23 U/L (ref 5–33)
ANION GAP SERPL CALCULATED.3IONS-SCNC: 15 MMOL/L (ref 9–17)
AST SERPL-CCNC: 21 U/L
BASOPHILS # BLD: 1 % (ref 0–2)
BASOPHILS ABSOLUTE: 0.03 K/UL (ref 0–0.2)
BILIRUB SERPL-MCNC: 0.65 MG/DL (ref 0.3–1.2)
BUN BLDV-MCNC: 11 MG/DL (ref 6–20)
BUN/CREAT BLD: ABNORMAL (ref 9–20)
CALCIUM SERPL-MCNC: 9.2 MG/DL (ref 8.6–10.4)
CHLORIDE BLD-SCNC: 101 MMOL/L (ref 98–107)
CO2: 22 MMOL/L (ref 20–31)
CREAT SERPL-MCNC: 0.6 MG/DL (ref 0.5–0.9)
DIFFERENTIAL TYPE: ABNORMAL
EOSINOPHILS RELATIVE PERCENT: 5 % (ref 1–4)
GFR AFRICAN AMERICAN: >60 ML/MIN
GFR NON-AFRICAN AMERICAN: >60 ML/MIN
GFR SERPL CREATININE-BSD FRML MDRD: ABNORMAL ML/MIN/{1.73_M2}
GFR SERPL CREATININE-BSD FRML MDRD: ABNORMAL ML/MIN/{1.73_M2}
GLUCOSE BLD-MCNC: 85 MG/DL (ref 70–99)
HCT VFR BLD CALC: 40.8 % (ref 36.3–47.1)
HEMOGLOBIN: 13.4 G/DL (ref 11.9–15.1)
IMMATURE GRANULOCYTES: 0 %
LYMPHOCYTES # BLD: 46 % (ref 24–43)
MCH RBC QN AUTO: 31.1 PG (ref 25.2–33.5)
MCHC RBC AUTO-ENTMCNC: 32.8 G/DL (ref 28.4–34.8)
MCV RBC AUTO: 94.7 FL (ref 82.6–102.9)
MONOCYTES # BLD: 7 % (ref 3–12)
NRBC AUTOMATED: 0 PER 100 WBC
PDW BLD-RTO: 12.3 % (ref 11.8–14.4)
PLATELET # BLD: 246 K/UL (ref 138–453)
PLATELET ESTIMATE: ABNORMAL
PMV BLD AUTO: 10.9 FL (ref 8.1–13.5)
POTASSIUM SERPL-SCNC: 3.5 MMOL/L (ref 3.7–5.3)
RBC # BLD: 4.31 M/UL (ref 3.95–5.11)
RBC # BLD: ABNORMAL 10*6/UL
SEG NEUTROPHILS: 41 % (ref 36–65)
SEGMENTED NEUTROPHILS ABSOLUTE COUNT: 2.3 K/UL (ref 1.5–8.1)
SODIUM BLD-SCNC: 138 MMOL/L (ref 135–144)
TOTAL PROTEIN: 7.1 G/DL (ref 6.4–8.3)
WBC # BLD: 5.7 K/UL (ref 3.5–11.3)
WBC # BLD: ABNORMAL 10*3/UL

## 2021-12-23 PROCEDURE — 93005 ELECTROCARDIOGRAM TRACING: CPT | Performed by: NURSE PRACTITIONER

## 2021-12-23 PROCEDURE — 85025 COMPLETE CBC W/AUTO DIFF WBC: CPT

## 2021-12-23 PROCEDURE — 80053 COMPREHEN METABOLIC PANEL: CPT

## 2021-12-23 PROCEDURE — 36415 COLL VENOUS BLD VENIPUNCTURE: CPT

## 2021-12-24 LAB
EKG ATRIAL RATE: 71 BPM
EKG Q-T INTERVAL: 422 MS
EKG QRS DURATION: 78 MS
EKG QTC CALCULATION (BAZETT): 458 MS
EKG R AXIS: -35 DEGREES
EKG T AXIS: 59 DEGREES
EKG VENTRICULAR RATE: 71 BPM

## 2021-12-26 ENCOUNTER — HOSPITAL ENCOUNTER (OUTPATIENT)
Dept: LAB | Age: 46
Setting detail: SPECIMEN
Discharge: HOME | End: 2021-12-26
Payer: COMMERCIAL

## 2021-12-26 DIAGNOSIS — Z01.818 PREOP TESTING: Primary | ICD-10-CM

## 2021-12-26 PROCEDURE — U0005 INFEC AGEN DETEC AMPLI PROBE: HCPCS

## 2021-12-26 PROCEDURE — U0003 INFECTIOUS AGENT DETECTION BY NUCLEIC ACID (DNA OR RNA); SEVERE ACUTE RESPIRATORY SYNDROME CORONAVIRUS 2 (SARS-COV-2) (CORONAVIRUS DISEASE [COVID-19]), AMPLIFIED PROBE TECHNIQUE, MAKING USE OF HIGH THROUGHPUT TECHNOLOGIES AS DESCRIBED BY CMS-2020-01-R: HCPCS

## 2021-12-27 LAB
SARS-COV-2: NORMAL
SARS-COV-2: NOT DETECTED
SOURCE: NORMAL

## 2021-12-29 ENCOUNTER — HOSPITAL ENCOUNTER (OUTPATIENT)
Age: 46
Setting detail: OBSERVATION
Discharge: HOME | End: 2021-12-30
Attending: ORTHOPAEDIC SURGERY | Admitting: ORTHOPAEDIC SURGERY
Payer: COMMERCIAL

## 2021-12-29 ENCOUNTER — ANESTHESIA (OUTPATIENT)
Dept: OPERATING ROOM | Age: 46
End: 2021-12-29
Payer: COMMERCIAL

## 2021-12-29 ENCOUNTER — ANESTHESIA EVENT (OUTPATIENT)
Dept: OPERATING ROOM | Age: 46
End: 2021-12-29
Payer: COMMERCIAL

## 2021-12-29 ENCOUNTER — APPOINTMENT (OUTPATIENT)
Dept: GENERAL RADIOLOGY | Age: 46
End: 2021-12-29
Attending: ORTHOPAEDIC SURGERY
Payer: COMMERCIAL

## 2021-12-29 VITALS — OXYGEN SATURATION: 100 % | SYSTOLIC BLOOD PRESSURE: 129 MMHG | DIASTOLIC BLOOD PRESSURE: 70 MMHG | TEMPERATURE: 96.8 F

## 2021-12-29 DIAGNOSIS — Z87.81 S/P TIBIAL FRACTURE: Primary | ICD-10-CM

## 2021-12-29 PROCEDURE — 6360000002 HC RX W HCPCS: Performed by: ORTHOPAEDIC SURGERY

## 2021-12-29 PROCEDURE — 2580000003 HC RX 258: Performed by: ANESTHESIOLOGY

## 2021-12-29 PROCEDURE — 6360000002 HC RX W HCPCS: Performed by: NURSE ANESTHETIST, CERTIFIED REGISTERED

## 2021-12-29 PROCEDURE — 2580000003 HC RX 258: Performed by: ORTHOPAEDIC SURGERY

## 2021-12-29 PROCEDURE — 3209999900 FLUORO FOR SURGICAL PROCEDURES

## 2021-12-29 PROCEDURE — 3600000004 HC SURGERY LEVEL 4 BASE: Performed by: ORTHOPAEDIC SURGERY

## 2021-12-29 PROCEDURE — 73590 X-RAY EXAM OF LOWER LEG: CPT

## 2021-12-29 PROCEDURE — 7100000000 HC PACU RECOVERY - FIRST 15 MIN: Performed by: ORTHOPAEDIC SURGERY

## 2021-12-29 PROCEDURE — 2709999900 HC NON-CHARGEABLE SUPPLY: Performed by: ORTHOPAEDIC SURGERY

## 2021-12-29 PROCEDURE — G0378 HOSPITAL OBSERVATION PER HR: HCPCS

## 2021-12-29 PROCEDURE — 6360000002 HC RX W HCPCS: Performed by: ANESTHESIOLOGY

## 2021-12-29 PROCEDURE — 3700000000 HC ANESTHESIA ATTENDED CARE: Performed by: ORTHOPAEDIC SURGERY

## 2021-12-29 PROCEDURE — C1713 ANCHOR/SCREW BN/BN,TIS/BN: HCPCS | Performed by: ORTHOPAEDIC SURGERY

## 2021-12-29 PROCEDURE — 2720000010 HC SURG SUPPLY STERILE: Performed by: ORTHOPAEDIC SURGERY

## 2021-12-29 PROCEDURE — 6370000000 HC RX 637 (ALT 250 FOR IP)

## 2021-12-29 PROCEDURE — 73610 X-RAY EXAM OF ANKLE: CPT

## 2021-12-29 PROCEDURE — 2580000003 HC RX 258: Performed by: NURSE ANESTHETIST, CERTIFIED REGISTERED

## 2021-12-29 PROCEDURE — 6370000000 HC RX 637 (ALT 250 FOR IP): Performed by: ORTHOPAEDIC SURGERY

## 2021-12-29 PROCEDURE — C1769 GUIDE WIRE: HCPCS | Performed by: ORTHOPAEDIC SURGERY

## 2021-12-29 PROCEDURE — 27792 TREATMENT OF ANKLE FRACTURE: CPT | Performed by: ORTHOPAEDIC SURGERY

## 2021-12-29 PROCEDURE — 2500000003 HC RX 250 WO HCPCS: Performed by: NURSE ANESTHETIST, CERTIFIED REGISTERED

## 2021-12-29 PROCEDURE — 3600000014 HC SURGERY LEVEL 4 ADDTL 15MIN: Performed by: ORTHOPAEDIC SURGERY

## 2021-12-29 PROCEDURE — 7100000001 HC PACU RECOVERY - ADDTL 15 MIN: Performed by: ORTHOPAEDIC SURGERY

## 2021-12-29 PROCEDURE — 2500000003 HC RX 250 WO HCPCS: Performed by: ANESTHESIOLOGY

## 2021-12-29 PROCEDURE — 3700000001 HC ADD 15 MINUTES (ANESTHESIA): Performed by: ORTHOPAEDIC SURGERY

## 2021-12-29 PROCEDURE — 27759 TREATMENT OF TIBIA FRACTURE: CPT | Performed by: ORTHOPAEDIC SURGERY

## 2021-12-29 DEVICE — K-WIRE: Type: IMPLANTABLE DEVICE | Site: TIBIA | Status: FUNCTIONAL

## 2021-12-29 DEVICE — LOCKING SCREW
Type: IMPLANTABLE DEVICE | Site: TIBIA | Status: FUNCTIONAL
Brand: T2 ALPHA

## 2021-12-29 DEVICE — BONE SCREW
Type: IMPLANTABLE DEVICE | Site: FIBULA | Status: FUNCTIONAL
Brand: VARIAX

## 2021-12-29 DEVICE — LOCKING SCREW
Type: IMPLANTABLE DEVICE | Site: FIBULA | Status: FUNCTIONAL
Brand: VARIAX

## 2021-12-29 DEVICE — DISTAL LATERAL FIBULA PLATE, 4 HOLE
Type: IMPLANTABLE DEVICE | Site: FIBULA | Status: FUNCTIONAL
Brand: VARIAX

## 2021-12-29 DEVICE — FIXATION K-WIRE SPI, WCH COATED
Type: IMPLANTABLE DEVICE | Site: TIBIA | Status: FUNCTIONAL
Brand: T2

## 2021-12-29 DEVICE — ADVANCED LOCKING SCREW: Type: IMPLANTABLE DEVICE | Site: TIBIA | Status: FUNCTIONAL

## 2021-12-29 RX ORDER — SODIUM CHLORIDE 0.9 % (FLUSH) 0.9 %
10 SYRINGE (ML) INJECTION PRN
Status: DISCONTINUED | OUTPATIENT
Start: 2021-12-29 | End: 2021-12-30 | Stop reason: HOSPADM

## 2021-12-29 RX ORDER — SODIUM CHLORIDE 9 MG/ML
25 INJECTION, SOLUTION INTRAVENOUS PRN
Status: DISCONTINUED | OUTPATIENT
Start: 2021-12-29 | End: 2021-12-30 | Stop reason: HOSPADM

## 2021-12-29 RX ORDER — ONDANSETRON 2 MG/ML
INJECTION INTRAMUSCULAR; INTRAVENOUS PRN
Status: DISCONTINUED | OUTPATIENT
Start: 2021-12-29 | End: 2021-12-29 | Stop reason: SDUPTHER

## 2021-12-29 RX ORDER — SODIUM CHLORIDE 9 MG/ML
25 INJECTION, SOLUTION INTRAVENOUS PRN
Status: DISCONTINUED | OUTPATIENT
Start: 2021-12-29 | End: 2021-12-29 | Stop reason: HOSPADM

## 2021-12-29 RX ORDER — LIDOCAINE HYDROCHLORIDE 10 MG/ML
INJECTION, SOLUTION EPIDURAL; INFILTRATION; INTRACAUDAL; PERINEURAL PRN
Status: DISCONTINUED | OUTPATIENT
Start: 2021-12-29 | End: 2021-12-29 | Stop reason: SDUPTHER

## 2021-12-29 RX ORDER — SODIUM CHLORIDE 0.9 % (FLUSH) 0.9 %
10 SYRINGE (ML) INJECTION PRN
Status: DISCONTINUED | OUTPATIENT
Start: 2021-12-29 | End: 2021-12-29 | Stop reason: HOSPADM

## 2021-12-29 RX ORDER — HYDROCODONE BITARTRATE AND ACETAMINOPHEN 5; 325 MG/1; MG/1
1 TABLET ORAL PRN
Status: COMPLETED | OUTPATIENT
Start: 2021-12-29 | End: 2021-12-29

## 2021-12-29 RX ORDER — PROPOFOL 10 MG/ML
INJECTION, EMULSION INTRAVENOUS PRN
Status: DISCONTINUED | OUTPATIENT
Start: 2021-12-29 | End: 2021-12-29 | Stop reason: SDUPTHER

## 2021-12-29 RX ORDER — DEXAMETHASONE SODIUM PHOSPHATE 10 MG/ML
INJECTION INTRAMUSCULAR; INTRAVENOUS PRN
Status: DISCONTINUED | OUTPATIENT
Start: 2021-12-29 | End: 2021-12-29 | Stop reason: SDUPTHER

## 2021-12-29 RX ORDER — SODIUM CHLORIDE 0.9 % (FLUSH) 0.9 %
10 SYRINGE (ML) INJECTION EVERY 12 HOURS SCHEDULED
Status: DISCONTINUED | OUTPATIENT
Start: 2021-12-29 | End: 2021-12-30 | Stop reason: HOSPADM

## 2021-12-29 RX ORDER — SODIUM CHLORIDE 9 MG/ML
INJECTION, SOLUTION INTRAVENOUS CONTINUOUS
Status: DISCONTINUED | OUTPATIENT
Start: 2021-12-29 | End: 2021-12-30 | Stop reason: HOSPADM

## 2021-12-29 RX ORDER — OXYCODONE HYDROCHLORIDE AND ACETAMINOPHEN 5; 325 MG/1; MG/1
1 TABLET ORAL EVERY 6 HOURS PRN
Qty: 20 TABLET | Refills: 0 | Status: SHIPPED | OUTPATIENT
Start: 2021-12-29 | End: 2022-01-05

## 2021-12-29 RX ORDER — MIDAZOLAM HYDROCHLORIDE 1 MG/ML
INJECTION INTRAMUSCULAR; INTRAVENOUS PRN
Status: DISCONTINUED | OUTPATIENT
Start: 2021-12-29 | End: 2021-12-29 | Stop reason: SDUPTHER

## 2021-12-29 RX ORDER — OXYCODONE HYDROCHLORIDE AND ACETAMINOPHEN 5; 325 MG/1; MG/1
1 TABLET ORAL EVERY 6 HOURS PRN
Status: DISCONTINUED | OUTPATIENT
Start: 2021-12-29 | End: 2021-12-30 | Stop reason: HOSPADM

## 2021-12-29 RX ORDER — SODIUM CHLORIDE, SODIUM LACTATE, POTASSIUM CHLORIDE, CALCIUM CHLORIDE 600; 310; 30; 20 MG/100ML; MG/100ML; MG/100ML; MG/100ML
INJECTION, SOLUTION INTRAVENOUS CONTINUOUS
Status: DISCONTINUED | OUTPATIENT
Start: 2021-12-29 | End: 2021-12-29

## 2021-12-29 RX ORDER — OXYCODONE HYDROCHLORIDE AND ACETAMINOPHEN 5; 325 MG/1; MG/1
2 TABLET ORAL EVERY 6 HOURS PRN
Status: DISCONTINUED | OUTPATIENT
Start: 2021-12-29 | End: 2021-12-30 | Stop reason: HOSPADM

## 2021-12-29 RX ORDER — FENTANYL CITRATE 50 UG/ML
INJECTION, SOLUTION INTRAMUSCULAR; INTRAVENOUS PRN
Status: DISCONTINUED | OUTPATIENT
Start: 2021-12-29 | End: 2021-12-29 | Stop reason: SDUPTHER

## 2021-12-29 RX ORDER — ACETAMINOPHEN 500 MG
1000 TABLET ORAL ONCE
Status: COMPLETED | OUTPATIENT
Start: 2021-12-29 | End: 2021-12-29

## 2021-12-29 RX ORDER — GLYCOPYRROLATE 1 MG/5 ML
SYRINGE (ML) INTRAVENOUS PRN
Status: DISCONTINUED | OUTPATIENT
Start: 2021-12-29 | End: 2021-12-29 | Stop reason: SDUPTHER

## 2021-12-29 RX ORDER — SODIUM CHLORIDE 0.9 % (FLUSH) 0.9 %
10 SYRINGE (ML) INJECTION EVERY 12 HOURS SCHEDULED
Status: DISCONTINUED | OUTPATIENT
Start: 2021-12-29 | End: 2021-12-29 | Stop reason: HOSPADM

## 2021-12-29 RX ORDER — HYDRALAZINE HYDROCHLORIDE 20 MG/ML
5 INJECTION INTRAMUSCULAR; INTRAVENOUS EVERY 10 MIN PRN
Status: DISCONTINUED | OUTPATIENT
Start: 2021-12-29 | End: 2021-12-29 | Stop reason: HOSPADM

## 2021-12-29 RX ORDER — HYDROCODONE BITARTRATE AND ACETAMINOPHEN 5; 325 MG/1; MG/1
2 TABLET ORAL PRN
Status: COMPLETED | OUTPATIENT
Start: 2021-12-29 | End: 2021-12-29

## 2021-12-29 RX ORDER — FENTANYL CITRATE 50 UG/ML
25 INJECTION, SOLUTION INTRAMUSCULAR; INTRAVENOUS EVERY 5 MIN PRN
Status: DISCONTINUED | OUTPATIENT
Start: 2021-12-29 | End: 2021-12-29 | Stop reason: HOSPADM

## 2021-12-29 RX ORDER — LIDOCAINE HYDROCHLORIDE 10 MG/ML
1 INJECTION, SOLUTION EPIDURAL; INFILTRATION; INTRACAUDAL; PERINEURAL
Status: DISCONTINUED | OUTPATIENT
Start: 2021-12-29 | End: 2021-12-29 | Stop reason: HOSPADM

## 2021-12-29 RX ORDER — ACETAMINOPHEN 500 MG
TABLET ORAL
Status: COMPLETED
Start: 2021-12-29 | End: 2021-12-29

## 2021-12-29 RX ORDER — HYDROCODONE BITARTRATE AND ACETAMINOPHEN 5; 325 MG/1; MG/1
TABLET ORAL
Status: COMPLETED
Start: 2021-12-29 | End: 2021-12-29

## 2021-12-29 RX ORDER — MORPHINE SULFATE 1 MG/ML
1 INJECTION, SOLUTION EPIDURAL; INTRATHECAL; INTRAVENOUS EVERY 5 MIN PRN
Status: DISCONTINUED | OUTPATIENT
Start: 2021-12-29 | End: 2021-12-29 | Stop reason: HOSPADM

## 2021-12-29 RX ORDER — ONDANSETRON 2 MG/ML
4 INJECTION INTRAMUSCULAR; INTRAVENOUS
Status: DISCONTINUED | OUTPATIENT
Start: 2021-12-29 | End: 2021-12-29 | Stop reason: HOSPADM

## 2021-12-29 RX ORDER — MORPHINE SULFATE 2 MG/ML
2 INJECTION, SOLUTION INTRAMUSCULAR; INTRAVENOUS EVERY 4 HOURS PRN
Status: DISCONTINUED | OUTPATIENT
Start: 2021-12-29 | End: 2021-12-30 | Stop reason: HOSPADM

## 2021-12-29 RX ORDER — ROCURONIUM BROMIDE 10 MG/ML
INJECTION, SOLUTION INTRAVENOUS PRN
Status: DISCONTINUED | OUTPATIENT
Start: 2021-12-29 | End: 2021-12-29 | Stop reason: SDUPTHER

## 2021-12-29 RX ORDER — DIPHENHYDRAMINE HYDROCHLORIDE 50 MG/ML
12.5 INJECTION INTRAMUSCULAR; INTRAVENOUS
Status: DISCONTINUED | OUTPATIENT
Start: 2021-12-29 | End: 2021-12-29 | Stop reason: HOSPADM

## 2021-12-29 RX ORDER — POLYETHYLENE GLYCOL 3350 17 G/17G
17 POWDER, FOR SOLUTION ORAL DAILY PRN
Status: DISCONTINUED | OUTPATIENT
Start: 2021-12-29 | End: 2021-12-30 | Stop reason: HOSPADM

## 2021-12-29 RX ORDER — PROMETHAZINE HYDROCHLORIDE 25 MG/ML
6.25 INJECTION, SOLUTION INTRAMUSCULAR; INTRAVENOUS
Status: DISCONTINUED | OUTPATIENT
Start: 2021-12-29 | End: 2021-12-29 | Stop reason: HOSPADM

## 2021-12-29 RX ORDER — NEOSTIGMINE METHYLSULFATE 5 MG/5 ML
SYRINGE (ML) INTRAVENOUS PRN
Status: DISCONTINUED | OUTPATIENT
Start: 2021-12-29 | End: 2021-12-29 | Stop reason: SDUPTHER

## 2021-12-29 RX ORDER — MEPERIDINE HYDROCHLORIDE 50 MG/ML
12.5 INJECTION INTRAMUSCULAR; INTRAVENOUS; SUBCUTANEOUS EVERY 5 MIN PRN
Status: DISCONTINUED | OUTPATIENT
Start: 2021-12-29 | End: 2021-12-29 | Stop reason: HOSPADM

## 2021-12-29 RX ADMIN — PHENYLEPHRINE HYDROCHLORIDE 100 MCG: 10 INJECTION INTRAVENOUS at 14:12

## 2021-12-29 RX ADMIN — FENTANYL CITRATE 25 MCG: 50 INJECTION, SOLUTION INTRAMUSCULAR; INTRAVENOUS at 16:03

## 2021-12-29 RX ADMIN — ONDANSETRON 4 MG: 2 INJECTION INTRAMUSCULAR; INTRAVENOUS at 15:37

## 2021-12-29 RX ADMIN — PHENYLEPHRINE HYDROCHLORIDE 100 MCG: 10 INJECTION INTRAVENOUS at 14:22

## 2021-12-29 RX ADMIN — FENTANYL CITRATE 50 MCG: 50 INJECTION, SOLUTION INTRAMUSCULAR; INTRAVENOUS at 13:54

## 2021-12-29 RX ADMIN — BISACODYL 5 MG: 5 TABLET, COATED ORAL at 18:19

## 2021-12-29 RX ADMIN — PROPOFOL 100 MG: 10 INJECTION, EMULSION INTRAVENOUS at 13:54

## 2021-12-29 RX ADMIN — OXYCODONE HYDROCHLORIDE AND ACETAMINOPHEN 2 TABLET: 5; 325 TABLET ORAL at 22:15

## 2021-12-29 RX ADMIN — ROCURONIUM BROMIDE 30 MG: 10 INJECTION INTRAVENOUS at 13:54

## 2021-12-29 RX ADMIN — ROCURONIUM BROMIDE 10 MG: 10 INJECTION INTRAVENOUS at 15:25

## 2021-12-29 RX ADMIN — LIDOCAINE HYDROCHLORIDE 50 MG: 10 INJECTION, SOLUTION EPIDURAL; INFILTRATION; INTRACAUDAL; PERINEURAL at 13:54

## 2021-12-29 RX ADMIN — PHENYLEPHRINE HYDROCHLORIDE 50 MCG: 10 INJECTION INTRAVENOUS at 14:08

## 2021-12-29 RX ADMIN — PHENYLEPHRINE HYDROCHLORIDE 100 MCG: 10 INJECTION INTRAVENOUS at 14:18

## 2021-12-29 RX ADMIN — Medication 4 MG: at 15:47

## 2021-12-29 RX ADMIN — SODIUM CHLORIDE: 9 INJECTION, SOLUTION INTRAVENOUS at 23:29

## 2021-12-29 RX ADMIN — CEFAZOLIN 2000 MG: 10 INJECTION, POWDER, FOR SOLUTION INTRAVENOUS at 14:04

## 2021-12-29 RX ADMIN — DEXAMETHASONE SODIUM PHOSPHATE 10 MG: 10 INJECTION INTRAMUSCULAR; INTRAVENOUS at 14:11

## 2021-12-29 RX ADMIN — HYDROCODONE BITARTRATE AND ACETAMINOPHEN 2 TABLET: 5; 325 TABLET ORAL at 17:18

## 2021-12-29 RX ADMIN — SODIUM CHLORIDE: 9 INJECTION, SOLUTION INTRAVENOUS at 18:20

## 2021-12-29 RX ADMIN — FENTANYL CITRATE 25 MCG: 50 INJECTION, SOLUTION INTRAMUSCULAR; INTRAVENOUS at 16:21

## 2021-12-29 RX ADMIN — CEFAZOLIN SODIUM 2000 MG: 10 INJECTION, POWDER, FOR SOLUTION INTRAVENOUS at 22:15

## 2021-12-29 RX ADMIN — FENTANYL CITRATE 50 MCG: 50 INJECTION, SOLUTION INTRAMUSCULAR; INTRAVENOUS at 13:56

## 2021-12-29 RX ADMIN — SODIUM CHLORIDE, POTASSIUM CHLORIDE, SODIUM LACTATE AND CALCIUM CHLORIDE: 600; 310; 30; 20 INJECTION, SOLUTION INTRAVENOUS at 11:48

## 2021-12-29 RX ADMIN — ACETAMINOPHEN 1000 MG: 500 TABLET ORAL at 11:34

## 2021-12-29 RX ADMIN — MIDAZOLAM HYDROCHLORIDE 2 MG: 1 INJECTION, SOLUTION INTRAMUSCULAR; INTRAVENOUS at 13:50

## 2021-12-29 RX ADMIN — PHENYLEPHRINE HYDROCHLORIDE 50 MCG/MIN: 10 INJECTION INTRAVENOUS at 14:30

## 2021-12-29 RX ADMIN — SODIUM CHLORIDE, PRESERVATIVE FREE 10 ML: 5 INJECTION INTRAVENOUS at 23:29

## 2021-12-29 RX ADMIN — Medication 0.8 MG: at 15:47

## 2021-12-29 RX ADMIN — FENTANYL CITRATE 25 MCG: 50 INJECTION, SOLUTION INTRAMUSCULAR; INTRAVENOUS at 16:12

## 2021-12-29 RX ADMIN — FENTANYL CITRATE 50 MCG: 50 INJECTION, SOLUTION INTRAMUSCULAR; INTRAVENOUS at 15:49

## 2021-12-29 RX ADMIN — FENTANYL CITRATE 25 MCG: 50 INJECTION, SOLUTION INTRAMUSCULAR; INTRAVENOUS at 16:07

## 2021-12-29 RX ADMIN — Medication 1000 MG: at 11:34

## 2021-12-29 RX ADMIN — ROCURONIUM BROMIDE 10 MG: 10 INJECTION INTRAVENOUS at 14:45

## 2021-12-29 ASSESSMENT — PULMONARY FUNCTION TESTS
PIF_VALUE: 3
PIF_VALUE: 3
PIF_VALUE: 14
PIF_VALUE: 15
PIF_VALUE: 1
PIF_VALUE: 15
PIF_VALUE: 17
PIF_VALUE: 3
PIF_VALUE: 4
PIF_VALUE: 14
PIF_VALUE: 15
PIF_VALUE: 14
PIF_VALUE: 15
PIF_VALUE: 14
PIF_VALUE: 14
PIF_VALUE: 15
PIF_VALUE: 9
PIF_VALUE: 3
PIF_VALUE: 15
PIF_VALUE: 7
PIF_VALUE: 3
PIF_VALUE: 15
PIF_VALUE: 3
PIF_VALUE: 14
PIF_VALUE: 3
PIF_VALUE: 15
PIF_VALUE: 5
PIF_VALUE: 15
PIF_VALUE: 3
PIF_VALUE: 13
PIF_VALUE: 14
PIF_VALUE: 15
PIF_VALUE: 15
PIF_VALUE: 3
PIF_VALUE: 1
PIF_VALUE: 15
PIF_VALUE: 15
PIF_VALUE: 3
PIF_VALUE: 14
PIF_VALUE: 15
PIF_VALUE: 14
PIF_VALUE: 13
PIF_VALUE: 3
PIF_VALUE: 14
PIF_VALUE: 15
PIF_VALUE: 3
PIF_VALUE: 15
PIF_VALUE: 15
PIF_VALUE: 14
PIF_VALUE: 15
PIF_VALUE: 15
PIF_VALUE: 1
PIF_VALUE: 15
PIF_VALUE: 13
PIF_VALUE: 13
PIF_VALUE: 2
PIF_VALUE: 15
PIF_VALUE: 14
PIF_VALUE: 15
PIF_VALUE: 14
PIF_VALUE: 14
PIF_VALUE: 15
PIF_VALUE: 15
PIF_VALUE: 13
PIF_VALUE: 3
PIF_VALUE: 1
PIF_VALUE: 15
PIF_VALUE: 3
PIF_VALUE: 15
PIF_VALUE: 2
PIF_VALUE: 14
PIF_VALUE: 2
PIF_VALUE: 0
PIF_VALUE: 15
PIF_VALUE: 14
PIF_VALUE: 19
PIF_VALUE: 15
PIF_VALUE: 15
PIF_VALUE: 16
PIF_VALUE: 15
PIF_VALUE: 3
PIF_VALUE: 2
PIF_VALUE: 1
PIF_VALUE: 1
PIF_VALUE: 15
PIF_VALUE: 3
PIF_VALUE: 14
PIF_VALUE: 15
PIF_VALUE: 15
PIF_VALUE: 3
PIF_VALUE: 1
PIF_VALUE: 15
PIF_VALUE: 0
PIF_VALUE: 14
PIF_VALUE: 14
PIF_VALUE: 2
PIF_VALUE: 15
PIF_VALUE: 14
PIF_VALUE: 14
PIF_VALUE: 15
PIF_VALUE: 14
PIF_VALUE: 18
PIF_VALUE: 15
PIF_VALUE: 15
PIF_VALUE: 1
PIF_VALUE: 3
PIF_VALUE: 15
PIF_VALUE: 14
PIF_VALUE: 3
PIF_VALUE: 14
PIF_VALUE: 3
PIF_VALUE: 15
PIF_VALUE: 9
PIF_VALUE: 16
PIF_VALUE: 15
PIF_VALUE: 15
PIF_VALUE: 14
PIF_VALUE: 15
PIF_VALUE: 3
PIF_VALUE: 3
PIF_VALUE: 15
PIF_VALUE: 17
PIF_VALUE: 15
PIF_VALUE: 15
PIF_VALUE: 3
PIF_VALUE: 15
PIF_VALUE: 14
PIF_VALUE: 15

## 2021-12-29 ASSESSMENT — PAIN SCALES - GENERAL
PAINLEVEL_OUTOF10: 0
PAINLEVEL_OUTOF10: 3
PAINLEVEL_OUTOF10: 0
PAINLEVEL_OUTOF10: 5
PAINLEVEL_OUTOF10: 5
PAINLEVEL_OUTOF10: 7
PAINLEVEL_OUTOF10: 0

## 2021-12-29 ASSESSMENT — PAIN DESCRIPTION - LOCATION: LOCATION: ANKLE;LEG

## 2021-12-29 ASSESSMENT — PAIN - FUNCTIONAL ASSESSMENT
PAIN_FUNCTIONAL_ASSESSMENT: PREVENTS OR INTERFERES SOME ACTIVE ACTIVITIES AND ADLS
PAIN_FUNCTIONAL_ASSESSMENT: 0-10

## 2021-12-29 ASSESSMENT — PAIN DESCRIPTION - FREQUENCY: FREQUENCY: CONTINUOUS

## 2021-12-29 ASSESSMENT — PAIN DESCRIPTION - DESCRIPTORS: DESCRIPTORS: ACHING;CRAMPING

## 2021-12-29 ASSESSMENT — PAIN DESCRIPTION - PROGRESSION: CLINICAL_PROGRESSION: GRADUALLY WORSENING

## 2021-12-29 ASSESSMENT — PAIN DESCRIPTION - PAIN TYPE: TYPE: ACUTE PAIN;SURGICAL PAIN

## 2021-12-29 ASSESSMENT — PAIN DESCRIPTION - ORIENTATION: ORIENTATION: RIGHT

## 2021-12-29 ASSESSMENT — PAIN DESCRIPTION - ONSET: ONSET: GRADUAL

## 2021-12-29 NOTE — OP NOTE
[facility] [place]  [address] [address]  Dept: [facility] Road: [phone]      Orthopedic Surgery Operative Report      Patient: Shirin Villegas      MRN#: [id number]     YOB: 1975      Date of Admission: 12/29/2021    Attending Surgeon: Richard Phelps M.D.   PCP: ALEKSANDRA Jang CNP        Preoperative Diagnosis:   1. Right tibia fracture  2. Right fibula fracture  3. History of multiple sclerosis    Postoperative Diagnosis:   1. Same as above    Procedures Performed:  (12/29/2021)  1. Right tibia fracture open treatment with intramedullary nail   2. Right fibula fracture open treatment with internal fixation      Implants:    [place] T2 alpha tibial nail (10 x 300 mm), [place] distal fibula plate with 3.5 millimeter screws  Implant Name Type Inv. Item Serial No.  Lot No. LRB No. Used Action   KWIRE [id number]  KWIRE [id number]  [employer] [id number] Right 1 Implanted   K WIRE FIX L285MM DIA3MM CO CHROM MET T2  K WIRE FIX L285MM DIA3MM CO CHROM MET T2  [employer] [id number] Right 1 Implanted   T2 ALPHA TIBIA SYSTEM TIBIA NAIL     [id number] Right 1 Implanted   SCREW BNE L50MM DIA5MM DANAE FOR T2 ALPHA NAILING SYS  SCREW BNE L50MM DIA5MM DANAE FOR T2 ALPHA NAILING SYS  [employer] [id number] Right 1 Implanted   SCREW BNE L325MM DIA5MM DANAE FOR T2 ALPHA NAILING SYS  SCREW BNE L325MM DIA5MM DANAE FOR T2 ALPHA NAILING SYS  [employer] [id number] Right 1 Implanted   SCREW LK 5.0X37MM  SCREW LK 5.0X37MM  [employer] [id number] Right 1 Implanted   SCREW BNE ADV LCK [id number]  SCREW BNE ADV LCK [id number]  [employer] [id number] Right 1 Implanted   SCREW BONE L30MM DIA5MM LCK FOR T2 ALPHA NAILING SYS  SCREW BONE L30MM DIA5MM LCK FOR T2 ALPHA NAILING SYS  [employer]WD [id number] Right 1 Implanted   SCREW BNE L18MM DIA3. 5MM TI ALLY NONLOCKING FULL THRD T10  SCREW BNE L18MM DIA3. 5MM TI ALLY NONLOCKING FULL THRD T10  [employer] [facility]  Right 1 Implanted   PLATE BNE X31PB [id number] 4 H LAT DST FIBULAR TI STR DANAE  PLATE BNE [id number] 4 H LAT DST FIBULAR TI STR DANAE  [employer] [facility]  Right 1 Implanted   SCREW BNE L14MM DIA3.5MM CANC TI DANAE FULL THRD T10 DRV FOR  SCREW BNE L14MM DIA3.5MM CANC TI DANAE FULL THRD T10 DRV FOR  [employer] [facility]  Right 1 Implanted   SCREW BNE L14MM DIA3.5MM ALEKSANDAR TI ST NONLOCKING FULL THRD  SCREW BNE L14MM DIA3.5MM ALEKSANDAR TI ST NONLOCKING FULL THRD  [employer] [facility]  Right 1 Implanted   SCREW BNE L12MM DIA3.5MM ALEKSANDAR TI ST NONLOCKING FULL THRD  SCREW BNE L12MM DIA3.5MM ALEKSANDAR TI ST NONLOCKING FULL THRD  [employer] [facility]  Right 1 Implanted   SCREW BNE L12MM DIA3.5MM CANC TI DANAE FULL THRD T10 DRV FOR  SCREW BNE L12MM DIA3.5MM CANC TI DANAE FULL THRD T10 DRV FOR  [employer] [facility]  Right 1 Implanted         Attending Surgeon:     Jonathan Finley MD      Assistant Surgeon:   Raven Avery DO      Anesthesia:    General      Staff:  Surgeon(s):  Lincoln Kapadia MD  Scrub Person First: [facility]  Anesthesia: Lico Pike MD      Estimated Blood Loss:    Less than 50 ml      Complications:    None      Specimen:    * No specimens in log *      History:    Ms. Tiffany Jones is a 40 y.o. female who was seen recently for the above problem. She has a history of a right displaced spiral fracture of the distal tibia shaft with distal fibula fracture, sustained on 12/20/2021.       Notably, she has a complex past medical history. She has a history of MS and chronic oral steroid use. She does have a history of right-sided weakness, and does ambulate independently, using a walker and/or furniture for assistance.       She is currently medically stable and appropriate for the planned procedure.      We have spoken about the risks/benefits/alternatives to a surgical intervention, verbal understanding of these risks was expressed, and informed consent was obtained. All questions were answered. No guarantees were made or implied. I have answered all questions, and they wish to proceed with surgical intervention. Operative Note:    The patient was identified in the preoperative holding area by name, MRN, and . The surgical site was verified and marked according to AAOS guidelines. The patient was taken to the operating room and placed on the operating table in a supine position. After achieving adequate sedation by the anesthesia team, the patient was then carefully positioned and all bony prominences were well padded. A tourniquet was placed on the ipsilateral thigh, and then the operative extremity was prepped and draped in the usual sterile fashion. A timeout was held in which the patient's identity, surgical site, procedure, allergies, and antibiotics were verified, and all in the room were in agreement, and thus the procedure began. The leg was exsanguinated to the level of the tourniquet. The tourniquet was elevated to 275 mm Hg, and the total tourniquet time was 37 minutes. An incision was marked out anteriorly superior to the patella, approximately 4 cm in length. The skin was incised sharply and vessels were cauterized. Careful dissection was then performed to the subcutaneous tissues to identify the quadriceps tendon. An arthrotomy was made through the quadriceps tendon and extensor mechanism in line with the incision, and careful blunt dissection was then performed to enter the patellofemoral joint. A soft tissue guide was placed through the patellofemoral joint, and a guidepin was placed under fluoroscopy using orthogonal views, advancing to the appropriate depth. A 12 mm opening reamer was then inserted on power and advanced distally in line with the wire. The initial guidepin and reamer were then removed.  A ball-tipped guidewire was then placed down the shaft of the tibia to the level of the fracture site, and its position was verified under fluoroscopy. With the fracture held in a reduced position, successive flexible reamers were placed down the shaft. The tibial shaft was reamed to 1.5 mm above the final implant choice, and a 10 mm diameter tibial nail was found to be appropriate, and the length was measured using a lateral of the knee joint. The nail was assembled with the insertion handle on the back table and carefully inserted into the intramedullary canal.  Fluoroscopy was utilized to determine the appropriate depth of insertion. The rotation of the lower extremity was verified both clinically and radiographically, and deemed to be appropriate. Once the nail was inserted to the appropriate depth, distal interlocking screws were placed using a perfect [place] technique. A small incision was made in the skin, blunt spreading through soft tissue was performed down to bone, and a path was drilled bicortically under fluoroscopy through the nail. The screw length was measured, and the screw was carefully inserted, verifying the position on fluoroscopy. A total of 2 screws were placed distal to the fracture site medial to lateral, to statically locked the nail, and a total of 2 screws were placed proximal to the fracture site using the targeting arm. Prior to insertion of the proximal interlocking screws, the fracture site was noted to be compressed appropriately without fracture gapping. An incision was marked out over the distal fibula, just posterior to the midline, starting a bit distal to the tip of the fibula and extending up proximally about 12 cm. The skin was incised sharply and vessels were cauterized. Careful dissection was then performed to identify and expose the fracture site, by elevating periosteum at the edges of the fracture site.  Distraction was then placed across the fracture site to facilitate cleaning out the organizing hematoma, using a suction tip, a Evern Glatter, a dental pick, and a pair of forceps, to remove any impediments to reduction. A combination of digital pressure, manual distraction, and a fracture reduction clamp were used to reduce the fracture. Once this was performed, the reduction was held into place provisionally with a K-wire, in addition to the clamp. An anatomic cortical read was directly visualized. There was some comminution noted. A combination of fluoroscopy (AP, mortise, and lateral views) and direct visualization were used to ensure that length, alignment, and rotation were restored. An appropriately sized plate was then chosen and verified under fluoro to allow appropriate fixation above and below the fracture site. A distal fibula plate was chosen. On the back table, the plate was contoured to fit the bone and sit appropriately and secured with a hemostat to help apply it to the bone. A 3.5 mm lag screw was placed by technique across the fracture site. The depth was measured using a depth gauge and the screw was placed by hand, which gave adequate purchase and compressed the fracture site without displacement. The plate was then placed onto the lateral fibula and manually held in place while its position was verified on fluoroscopy and deemed to be appropriate. Screws were placed above and below the fracture site to provide stable fixation, until 3 screws were proximal to the fracture, and 4 screws were distal to the fracture. Locking screws were used distally to allow for adequate fixation. Fluoroscopy was utilized, and the reduction was deemed to be appropriate and the hardware was in appropriate and safe position. The fixation was stable. Final radiographs were obtained and reviewed, and the fracture and hardware were deemed to be in appropriate position. The foot and toes were all noted to be well perfused. The compartments of the leg were soft and compressible.     Copious sterile irrigation was used to rinse the operative sites, and a layered closure was performed using 0 vicryl to close the extensor mechanism and deep fascia, 2-0 vicryl and 3-0 nylon interrupted sutures, providing appropriate tension to the skin. The skin was cleaned and gently dried. The incisions were then covered with sterile dressings. A sterile layer of cast padding was then applied. A soft dressing with Ace wrap was then placed. The patient was aroused from anesthesia without complication, and gently transferred to the bed and then transported to the postoperative area in a stable condition. The patient was noted to have tolerated the procedure well without complication. Postoperative Plan:         Precautions:  Weight bearing as tolerated on the Right lower extremity   -             []  Physical Therapy/Home exercises       Immobilization:      []  Splint/Cast  [x]  CAM boot  []  Comfortable shoe    []  Other:      DVT ppx:   [x]  Early mobilization       [x]  Medication as prescribed (Eliquis)      []  No chemical ppx needed; mechanical only   -    Pain control:  Medication as prescribed (dosing and quantity) indicated for acute postoperative pain control   -    Special concerns:       []          [x]  Avoid strenuous activity/pain provoking maneuvers and high-impact repetitive exercises    -    Disposition:   PACU      The patient has been instructed to follow up in the office. The particulars of surgery as well as the condition of the patient postoperatively were discussed with the patients family thereafter per the patient's wishes.            Valery Acosta MD  Orthopedic Surgery

## 2021-12-29 NOTE — ANESTHESIA PRE PROCEDURE
Department of Anesthesiology  Preprocedure Note       Name:  Ivana Richard   Age:  40 y.o.  :  1975                                          MRN:  [id number]         Date:  2021      Surgeon: Fede Betts):  Viv Hamilton MD    Procedure: Procedure(s):  RIGHT TIB FIB OPEN REDUCTION INTERNAL FIXATION WITH CHRISTOPHER    Medications prior to admission:   Prior to Admission medications    Medication Sig Start Date End Date Taking? Authorizing Provider   ALPRAZolam Cindia Muss) 0.5 MG tablet Take 0.5 mg by mouth nightly as needed. 21   Historical Provider, MD   primidone (MYSOLINE) 50 MG tablet Take one tab nightly 21   Oz Chilel MD   Cholecalciferol (VITAMIN D3) 8158013 UNIT/GM LIQD 4 ml in AM, 2 ml in afternoon and 4 ml HS    Historical Provider, MD   Amphetamine (ADZENYS XR-ODT PO) Take by mouth    Historical Provider, MD   PROAIR  (90 BASE) MCG/ACT inhaler  10/14/15   Historical Provider, MD   HYDROcodone-acetaminophen ([facility]) 5-325 MG per tablet  11/13/15   Historical Provider, MD   butalbital-acetaminophen-caffeine (FIORICET, ESGIC) per tablet Take 1 tablet by mouth as needed for Pain. Historical Provider, MD   Fluticasone Propionate  HFA (FLOVENT HFA IN) Inhale  into the lungs.     Historical Provider, MD       Current medications:    Current Facility-Administered Medications   Medication Dose Route Frequency Provider Last Rate Last Admin    0.9 % sodium chloride infusion  25 mL IntraVENous PRN Sylvia Dahl MD        lactated ringers infusion   IntraVENous Continuous Sylvia Dahl MD        lidocaine PF 1 % injection 1 mL  1 mL IntraDERmal Once PRN Sylvia Dahl MD        sodium chloride flush 0.9 % injection 10 mL  10 mL IntraVENous 2 times per day Sylvia Dahl MD        sodium chloride flush 0.9 % injection 10 mL  10 mL IntraVENous PRN Sylvia Dahl MD        ceFAZolin (ANCEF) 2000 mg in dextrose 5 % 50 mL IVPB  2,000 mg IntraVENous Once Сергей Apple MD        acetaminophen (TYLENOL) tablet 1,000 mg  1,000 mg Oral Once Obdulia Holloway MD        acetaminophen (TYLENOL) 500 MG tablet             ceFAZolin (ANCEF) IVPB                Allergies: Allergies   Allergen Reactions    Latex     Red Dye Nausea Only    Scopolamine Rash    Asa [Aspirin]      ?  Brexpiprazole     Duloxetine Hives    Naproxen      Nose bleeds    Nitrofurantoin Nausea Only    Other      Spermicides, metal, adhesive tape. Unknown antibiotic, orange and red dye       Problem List:    Patient Active Problem List   Diagnosis Code    Abnormal Pap smear [id number]    Migraine G43.909    Asthma J45.909    Anxiety F41.9    Depression F32. A    Post-traumatic stress syndrome F43.10    Multiple sclerosis, relapsing-remitting (HCC) G35    Lower paraplegia (HCC) G82.20    Tremor, essential G25.0       Past Medical History:        Diagnosis Date    Abnormal Pap smear     unsure when  or what    Abnormal Pap smear of cervix 10/21/15     LSIL (-)HPV    Anxiety     Asthma     Depression     Migraine     Multiple sclerosis (HCC)     Post-traumatic stress syndrome        Past Surgical History:        Procedure Laterality Date    COLPOSCOPY      CORNEAL TRANSPLANT Bilateral     EYE SURGERY      stye removal    HYSTERECTOMY      TEAR DUCT SURGERY Bilateral     WISDOM TOOTH EXTRACTION         Social History:    Social History     Tobacco Use    Smoking status: Never Smoker    Smokeless tobacco: Never Used   Substance Use Topics    Alcohol use: Not Currently                                Counseling given: Not Answered      Vital Signs (Current): There were no vitals filed for this visit.                                            BP Readings from Last 3 Encounters:   12/20/21 119/87   12/02/21 121/72   12/01/21 134/73       NPO Status:                                                                                 BMI:   Wt Readings from Last 3 Encounters: 12/21/21 109 lb (49.4 kg)   12/20/21 109 lb (49.4 kg)   11/25/21 109 lb (49.4 kg)     There is no height or weight on file to calculate BMI.    CBC:   Lab Results   Component Value Date    WBC 5.7 12/23/2021    RBC 4.31 12/23/2021    HGB 13.4 12/23/2021    HCT 40.8 12/23/2021    MCV 94.7 12/23/2021    RDW 12.3 12/23/2021     12/23/2021       CMP:   Lab Results   Component Value Date     12/23/2021    K 3.5 12/23/2021     12/23/2021    CO2 22 12/23/2021    BUN 11 12/23/2021    CREATININE 0.60 12/23/2021    GFRAA >60 12/23/2021    LABGLOM >60 12/23/2021    GLUCOSE 85 12/23/2021    PROT 7.1 12/23/2021    CALCIUM 9.2 12/23/2021    BILITOT 0.65 12/23/2021    ALKPHOS 49 12/23/2021    AST 21 12/23/2021    ALT 23 12/23/2021       POC Tests: No results for input(s): POCGLU, POCNA, POCK, POCCL, POCBUN, POCHEMO, POCHCT in the last 72 hours.     Coags: No results found for: PROTIME, INR, APTT    HCG (If Applicable):   Lab Results   Component Value Date    HCG NEGATIVE 11/13/2015        ABGs: No results found for: PHART, PO2ART, QZT7CEK, YDX4WUP, BEART, W5TMVVMS     Type & Screen (If Applicable):  No results found for: LABABO, LABRH    Drug/Infectious Status (If Applicable):  No results found for: HIV, HEPCAB    COVID-19 Screening (If Applicable):   Lab Results   Component Value Date    COVID19 Not Detected 12/26/2021           Anesthesia Evaluation  Patient summary reviewed and Nursing notes reviewed no history of anesthetic complications:   Airway: Mallampati: II  TM distance: >3 FB   Neck ROM: full  Mouth opening: > = 3 FB Dental: normal exam         Pulmonary:normal exam  breath sounds clear to auscultation  (+) asthma:                            Cardiovascular:Negative CV ROS          ECG reviewed  Rhythm: regular  Rate: normal                    Neuro/Psych:   (+) neuromuscular disease: multiple sclerosis, headaches: migraine headaches, psychiatric history: stable with treatmentdepression/anxiety ROS comment: Lower paraplegia  Tremor, essential GI/Hepatic/Renal: Neg GI/Hepatic/Renal ROS            Endo/Other:                      ROS comment: RIGHT TIB FIB FRACTURE Abdominal:       Abdomen: soft. Vascular: negative vascular ROS. Other Findings:             Anesthesia Plan      general     ASA 3     (GA and possible regional nerve block for postop pain control, benefits, risks and complications of GA and nerve block d/w patient and her , they agree to proceed, questions answered.)  Induction: intravenous. MIPS: Postoperative opioids intended and Prophylactic antiemetics administered. Anesthetic plan and risks discussed with patient. Plan discussed with CRNA.                 Missy Fraga MD   12/29/2021

## 2021-12-29 NOTE — PROGRESS NOTES
I spoke to the patient before heading to the OR, and the operative site was identified, verified and marked. The procedure was verified. We have reviewed the risks, benefits, and alternatives to the procedure. No guarantees were made. I have also reviewed the history and physical. There are no significant changes in medical history. All questions were answered and they wish to proceed as planned.      Electronically signed by Perla Sky MD

## 2021-12-29 NOTE — ANESTHESIA POSTPROCEDURE EVALUATION
POST- ANESTHESIA EVALUATION       Pt Name: Neal Galicia  MRN: [id number]  [place]: 1975  Date of evaluation: 12/29/2021  Time:  4:49 PM      /82   Pulse 68   Temp 96.8 °F (36 °C) (Temporal)   Resp 14   Ht 5' 2\" (1.575 m)   Wt 109 lb (49.4 kg)   LMP 11/27/2015 (Exact Date)   SpO2 100%   BMI 19.94 kg/m²      Consciousness Level  Awake  Cardiopulmonary Status  Stable  Pain Adequately Treated YES  Nausea / Vomiting  NO  Adequate Hydration  YES  Anesthesia Related Complications NONE      Electronically signed by Tamara Carranza MD on 12/29/2021 at 4:49 PM       Department of Anesthesiology  Postprocedure Note    Patient: Neal Galicia  MRN: [id number]  YOB: 1975  Date of evaluation: 12/29/2021  Time:  4:49 PM     Procedure Summary     Date: 12/29/21 Room / Location: [address] [place] [address]    Anesthesia Start: 4808 Anesthesia Stop: 4854    Procedure: RIGHT TIB FIB OPEN REDUCTION INTERNAL FIXATION WITH CHRISTOPHER (Right ) Diagnosis: (RIGHT TIB FIB FRACTURE)    Surgeons: Anthony Ortega MD Responsible Provider: Tamara Carranza MD    Anesthesia Type: general ASA Status: 3          Anesthesia Type: general    Ty Phase I: Ty Score: 9    Ty Phase II:      Last vitals: Reviewed and per EMR flowsheets.        Anesthesia Post Evaluation

## 2021-12-30 VITALS
OXYGEN SATURATION: 100 % | WEIGHT: 126.32 LBS | TEMPERATURE: 98.4 F | HEIGHT: 62 IN | BODY MASS INDEX: 23.25 KG/M2 | HEART RATE: 84 BPM | SYSTOLIC BLOOD PRESSURE: 117 MMHG | RESPIRATION RATE: 16 BRPM | DIASTOLIC BLOOD PRESSURE: 69 MMHG

## 2021-12-30 PROCEDURE — 99218 PR INITIAL OBSERVATION CARE/DAY 30 MINUTES: CPT | Performed by: INTERNAL MEDICINE

## 2021-12-30 PROCEDURE — 6370000000 HC RX 637 (ALT 250 FOR IP): Performed by: ORTHOPAEDIC SURGERY

## 2021-12-30 PROCEDURE — 97166 OT EVAL MOD COMPLEX 45 MIN: CPT

## 2021-12-30 PROCEDURE — 97535 SELF CARE MNGMENT TRAINING: CPT

## 2021-12-30 PROCEDURE — 6360000002 HC RX W HCPCS: Performed by: ORTHOPAEDIC SURGERY

## 2021-12-30 PROCEDURE — 97162 PT EVAL MOD COMPLEX 30 MIN: CPT

## 2021-12-30 PROCEDURE — 97116 GAIT TRAINING THERAPY: CPT

## 2021-12-30 PROCEDURE — 97530 THERAPEUTIC ACTIVITIES: CPT

## 2021-12-30 PROCEDURE — G0378 HOSPITAL OBSERVATION PER HR: HCPCS

## 2021-12-30 RX ADMIN — OXYCODONE HYDROCHLORIDE AND ACETAMINOPHEN 2 TABLET: 5; 325 TABLET ORAL at 04:15

## 2021-12-30 RX ADMIN — OXYCODONE HYDROCHLORIDE AND ACETAMINOPHEN 2 TABLET: 5; 325 TABLET ORAL at 12:56

## 2021-12-30 RX ADMIN — CEFAZOLIN SODIUM 2000 MG: 10 INJECTION, POWDER, FOR SOLUTION INTRAVENOUS at 05:30

## 2021-12-30 RX ADMIN — ENOXAPARIN SODIUM 30 MG: 100 INJECTION SUBCUTANEOUS at 08:32

## 2021-12-30 RX ADMIN — BISACODYL 5 MG: 5 TABLET, COATED ORAL at 08:29

## 2021-12-30 ASSESSMENT — PAIN DESCRIPTION - FREQUENCY
FREQUENCY: INTERMITTENT
FREQUENCY: INTERMITTENT

## 2021-12-30 ASSESSMENT — PAIN DESCRIPTION - DESCRIPTORS
DESCRIPTORS: ACHING;DISCOMFORT

## 2021-12-30 ASSESSMENT — PAIN SCALES - GENERAL
PAINLEVEL_OUTOF10: 6
PAINLEVEL_OUTOF10: 2
PAINLEVEL_OUTOF10: 2
PAINLEVEL_OUTOF10: 5
PAINLEVEL_OUTOF10: 5
PAINLEVEL_OUTOF10: 0

## 2021-12-30 ASSESSMENT — PAIN DESCRIPTION - PAIN TYPE
TYPE: ACUTE PAIN;SURGICAL PAIN

## 2021-12-30 ASSESSMENT — PAIN DESCRIPTION - LOCATION
LOCATION: ANKLE
LOCATION: ANKLE;LEG
LOCATION: ANKLE

## 2021-12-30 ASSESSMENT — PAIN DESCRIPTION - ORIENTATION
ORIENTATION: RIGHT

## 2021-12-30 ASSESSMENT — PAIN - FUNCTIONAL ASSESSMENT
PAIN_FUNCTIONAL_ASSESSMENT: PREVENTS OR INTERFERES SOME ACTIVE ACTIVITIES AND ADLS

## 2021-12-30 ASSESSMENT — PAIN DESCRIPTION - PROGRESSION: CLINICAL_PROGRESSION: GRADUALLY WORSENING

## 2021-12-30 ASSESSMENT — PAIN DESCRIPTION - ONSET: ONSET: ON-GOING

## 2021-12-30 NOTE — CARE COORDINATION
12:33  Verbal order per Dr Mikie Taylor for [address]. Patient provided with Home Care list.     12:57 Pt and significant other request Med 1 care . Referral sent. 13:24 writer contacted [facility] regarding referral.     14:09  Pt and significant other at nurses station. States that they are ready to leave. Will contact them regarding home care. 14:30 Home care information faxed to [facility] care. 15:43 spoke with Les Hanley, referral received. 15:47 Dr Mikie Taylor office contacted regarding [address] completion. Paged via office staff to complete.

## 2021-12-30 NOTE — DISCHARGE INSTR - COC
Continuity of Care Form    Patient Name: Noa Beck   :  1975  MRN:  [id number]    Admit date:  2021  Discharge date:  2021    Code Status Order: Full Code   Advance Directives:   Advance Care Flowsheet Documentation       Date/Time Healthcare Directive Type of Healthcare Directive Copy in [address] Agent's Name Healthcare Agent's Phone Number    21 1130 Yes, patient has an advance directive for healthcare treatment Durable power of  for health care; Health care treatment directive; Living will Yes, copy in chart Spouse Magda Martel --    21 1120 Yes, patient has an advance directive for healthcare treatment Durable power of  for health care; Health care treatment directive; Living will Yes, copy in chart Spouse -- --            Admitting Physician:  Dmitry Franz MD  PCP: ALEKSANDRA Montero CNP    Discharging Nurse: LAWRENCE  [address] Unit/Room#: 343/450-57  Discharging Unit Phone Number: [phone]    Emergency Contact:   Extended Emergency Contact Information  Primary Emergency Contact: Scarlet Caruso [address] Phone: [phone]  Relation: Other    Past Surgical History:  Past Surgical History:   Procedure Laterality Date    COLPOSCOPY      CORNEAL TRANSPLANT Bilateral     EYE SURGERY      stye removal    HYSTERECTOMY      TEAR DUCT SURGERY Bilateral     TIBIA FRACTURE SURGERY Right     WISDOM TOOTH EXTRACTION         Immunization History: There is no immunization history on file for this patient. Active Problems:  Patient Active Problem List   Diagnosis Code    Abnormal Pap smear [id number]    Migraine G43.909    Asthma J45.909    Anxiety F41.9    Depression F32. A    Post-traumatic stress syndrome F43.10    Multiple sclerosis, relapsing-remitting (HCC) G35    Lower paraplegia (HCC) G82.20    Tremor, essential G25.0    Closed fracture of shaft of right tibia S82.201A    S/p tibial fracture Z87.81       Isolation/Infection:   Isolation            No Isolation          Patient Infection Status       Infection Onset Added Last Indicated Last Indicated By Review Planned Expiration Resolved Resolved By    None active    Resolved    COVID-19 (Rule Out) 12/26/21 12/26/21 12/26/21 COVID-19 (Ordered)   12/27/21 Rule-Out Test Resulted            Nurse Assessment:  Last Vital Signs: /69   Pulse 84   Temp 98.4 °F (36.9 °C) (Oral)   Resp 16   Ht 5' 2\" (1.575 m)   Wt 126 lb 5.2 oz (57.3 kg)   LMP 11/27/2015 (Exact Date)   SpO2 100%   BMI 23.10 kg/m²     Last documented pain score (0-10 scale): Pain Level: 5  Last Weight:   Wt Readings from Last 1 Encounters:   12/29/21 126 lb 5.2 oz (57.3 kg)     Mental Status:  oriented and alert    IV Access:  - None    Nursing Mobility/ADLs:  Walking   Assisted  Transfer  Assisted  Bathing  Assisted  Dressing  Assisted  Toileting  Assisted  Feeding  Independent  Med Admin  Independent  Med Delivery   whole    Wound Care Documentation and Therapy:        Elimination:  Continence: Bowel: Yes  Bladder: Yes  Urinary Catheter: None   Colostomy/Ileostomy/Ileal Conduit: No       Date of Last BM: 12/28/2021    Intake/Output Summary (Last 24 hours) at 12/30/2021 1540  Last data filed at 12/30/2021 1130  Gross per 24 hour   Intake 2960.05 ml   Output 970 ml   Net 1990.05 ml     I/O last 3 completed shifts: In: 3660.1 [P.O.:200; I.V.:3460.1]  Out: 1 [Urine:970]    Safety Concerns: At Risk for Falls    Impairments/Disabilities:      MS    Nutrition Therapy:  Current Nutrition Therapy:   - Oral Diet:  General    Routes of Feeding: Oral  Liquids: No Restrictions  Daily Fluid Restriction: no  Last Modified Barium Swallow with Video (Video Swallowing Test): not done    Treatments at the Time of Hospital Discharge:   Respiratory Treatments: n/a  Oxygen Therapy:  is not on home oxygen therapy.   Ventilator:    - No ventilator support    Rehab Therapies: Physical Therapy and

## 2021-12-30 NOTE — DISCHARGE INSTR - DIET

## 2021-12-30 NOTE — PLAN OF CARE
Problem: Skin Integrity:  Goal: Absence of new skin breakdown  Description: Absence of new skin breakdown  Outcome: Ongoing     Problem: Pain:  Description: Pain management should include both nonpharmacologic and pharmacologic interventions.   Goal: Pain level will decrease  Description: Pain level will decrease  Outcome: Ongoing     Problem: Falls - Risk of:  Goal: Will remain free from falls  Description: Will remain free from falls  Outcome: Ongoing

## 2021-12-30 NOTE — PROGRESS NOTES
RN called patient's pharmacy per Dr. Radha Abdullahi request d/t patient stating she is on a steroid. Pharmacy states they do not fill a steroid of any kind. Dr. Murphy notified via [address] [place] [address]. Awaiting response.

## 2021-12-30 NOTE — CARE COORDINATION
Case Management Initial Discharge Plan  [address] [address],             Met with:patient to discuss discharge plans. Information verified: address, contacts, phone number, , insurance Yes  Insurance Provider: [facility]    Emergency Contact/Next of Kin name & number: Reina Hays [phone]  Who are involved in patient's support system? Reina Hays    PCP: Jess Mason, APRN - CNP  Date of last visit: few weeks ago      Discharge Planning    Living Arrangements:  Spouse/Significant Other     Home has 1 stories  3 stairs to climb to get into front door, 0 stairs to climb to reach second floor  Location of bedroom/bathroom in home first floor    Patient able to perform ADL's:Independent    Current Services (outpatient & in home) n/a  DME equipment: walker and cane  DME provider: equipment at [facility]    Is patient receiving oral anticoagulation therapy? Yes Eliquis    If indicated:   Physician managing anticoagulation treatment: n/a  Where does patient obtain lab work for ATC treatment? n/a      Potential Assistance Needed:  Outpatient PT/OT,Prescription Assistance,Home Care    Patient agreeable to home care: Denies Need at this time  Freedom of choice provided:  n/a    Prior SNF/Rehab Placement and Facility: n/a  Agreeable to SNF/Rehab: No  [place] of choice provided: no     Evaluation: no    Expected Discharge date:  22    Patient expects to be discharged to: home with Reina Hays   If home: is the family and/or caregiver wiling & able to provide support at home? yes  Who will be providing this support? Reina Hays  Follow Up Appointment: Best Day/ Time: Monday AM    Transportation provider: Reina Hays  Transportation arrangements needed for discharge: No    Readmission Risk              Risk of Unplanned Readmission:  0             Does patient have a readmission risk score greater than 14?: No  If yes, follow-up appointment must be made within 7 days of discharge.      Goals of Care: PT/OT evaluation Educated patient on transitional options, provided freedom of choice and are agreeable with plan      Discharge Plan: Home with Isom Ormond, Outpatient rx for PT/OT           Electronically signed by Gene Garibay RN on 12/30/21 at 9:07 AM EST

## 2021-12-30 NOTE — CONSULTS
[facility]  Office: [address], DO, Deven Chen, DO, Chilo Dominguez, DO, Ramo [place] Blood, DO, Cordell Alves MD, Cary Hinds MD, Brittany Samuels MD, Cielo Naranjo MD, Sammi Thornton MD, Tasneem Smith MD, Lisset Marie MD, Maikel Connelly, DO, Devyn Dee DO, Ivonne Correia MD,  Rory Juan, DO, Ricky Bennett MD, Thong Wade MD, Leonardo Juarez MD, Judy Jarvis MD, Asa Obrien MD, Stacy Rivera MD, Marisa Maxwell MD, Axel Zhou, [facility], [facility], CNP, Iva Rios, CNP, Jere Argueta, CNS, [facility], CNP, Sandra Walker, CNP, Nasrin Bansal, CNP, Jordyn Ocean, CNP, Rizwan Jones, CNP, Bipin Soliz PA-C, Williams Mason, DNP, Markie Barros, [facility], [place] Hobbs, CNP, Dudley Wade, CNP, Raven Duarte, CNP, Dustin Cleary, CNP, Sloan Clark, CNP, Collette Estrada, [address] [facility] / HISTORY AND PHYSICAL EXAMINATION            Date:   12/30/2021  Patient name:  Corry Solano  Date of admission:  12/29/2021 10:44 AM  MRN:   [id number]  Account:  [de-identified]  YOB: 1975  PCP:    ALEKSANDRA Lyle CNP  Room:   [address] [place] [address]-  Code Status:    Full Code    Physician Requesting Consult: Precious Saunders MD    Reason for Consult:  Medical management    Chief Complaint:     Tibia fracture    History Obtained From:     patient, electronic medical record    History of Present Illness: This 40 yof presents with accidental fall due to instability from her MS which recently flared up. She is on daily steroids.   She sustained tibia fracture repaired 12/29 by ortho, we were asked to see for medical management    Past Medical History:     Past Medical History:   Diagnosis Date    Abnormal Pap smear     unsure when  or what    Abnormal Pap smear of cervix 10/21/15     LSIL (-)HPV    Anxiety     Asthma     Depression     Migraine     Multiple sclerosis ([address]     Post-traumatic stress syndrome         Past Surgical History:     Past Surgical History:   Procedure Laterality Date    COLPOSCOPY      CORNEAL TRANSPLANT Bilateral     EYE SURGERY      stye removal    HYSTERECTOMY      TEAR DUCT SURGERY Bilateral     TIBIA FRACTURE SURGERY Right     WISDOM TOOTH EXTRACTION          Medications Prior to Admission:     Prior to Admission medications    Medication Sig Start Date End Date Taking? Authorizing Provider   oxyCODONE-acetaminophen (PERCOCET) 5-325 MG per tablet Take 1 tablet by mouth every 6 hours as needed for Pain for up to 7 days. Do not exceed 3g of Acetaminophen in 24 hrs. 12/29/21 1/5/22 Yes Dheeraj Christian MD   apixaban (ELIQUIS) 2.5 MG TABS tablet Take 1 tablet by mouth 2 times daily 12/29/21 2/9/22 Yes Dheeraj Christian MD   ALPRAZolam [place] Huddle) 0.5 MG tablet Take 0.5 mg by mouth nightly as needed. 11/9/21  Yes Historical Provider, MD   Amphetamine (ADZENYS XR-ODT PO) Take by mouth   Yes Historical Provider, MD   HYDROcodone-acetaminophen (S[place]rift Bridget) 5-325 MG per tablet  11/13/15  Yes Historical Provider, MD   butalbital-acetaminophen-caffeine (FIORICET, ESGIC) per tablet Take 1 tablet by mouth as needed for Pain. Yes Historical Provider, MD   primidone (MYSOLINE) 50 MG tablet Take one tab nightly 11/23/21   Cali Espinoza MD   Cholecalciferol (VITAMIN D3) [id number] UNIT/GM LIQD 4 ml in AM, 2 ml in afternoon and 4 ml HS    Historical Provider, MD   PROAIR  (90 BASE) MCG/ACT inhaler  10/14/15   Historical Provider, MD   Fluticasone Propionate  HFA (FLOVENT HFA IN) Inhale  into the lungs. Historical Provider, MD        Allergies:     Latex, Red dye, Scopolamine, Asa [aspirin], Brexpiprazole, Duloxetine, Naproxen, Nitrofurantoin, and Other    Social History:     Tobacco:    reports that she has never smoked. She has never used smokeless tobacco.  Alcohol:      reports previous alcohol use. Drug Use:  reports no history of drug use.     Family History:     Family History   Problem Relation Age of Onset   Anderson Cancer Mother         bile duct with mets everywhere    Diabetes Mother     High Blood Pressure Mother     Clotting Disorder Mother     Heart Disease Father         CHF    Diabetes Father     COPD Father     Clotting Disorder Father     Breast Cancer Maternal Grandmother     High Blood Pressure Maternal Grandmother     Tuberculosis Paternal Grandmother     Cancer Paternal Grandfather     Heart Disease Paternal Grandfather         CHF    COPD Maternal Grandfather        Review of Systems:     Positive and Negative as described in HPI. CONSTITUTIONAL:  negative for fevers, chills, sweats, fatigue, weight loss  HEENT:  negative for vision, hearing changes, runny nose, throat pain  RESPIRATORY:  negative for shortness of breath, cough, congestion, wheezing. CARDIOVASCULAR:  negative for chest pain, palpitations. GASTROINTESTINAL:  negative for nausea, vomiting, diarrhea, constipation, change in bowel habits, abdominal pain   GENITOURINARY:  negative for difficulty of urination, burning with urination, frequency   INTEGUMENT:  negative for rash, skin lesions, easy bruising   HEMATOLOGIC/LYMPHATIC:  negative for swelling/edema   ALLERGIC/IMMUNOLOGIC:  negative for urticaria , itching  ENDOCRINE:  negative increase in drinking, increase in urination, hot or cold intolerance  MUSCULOSKELETAL:  negative joint pains, muscle aches, swelling of joints  NEUROLOGICAL:  negative for headaches, dizziness, lightheadedness, numbness, pain  BEHAVIOR/PSYCH:  negative for depression, anxiety    Physical Exam:     /69   Pulse 84   Temp 98.4 °F (36.9 °C) (Oral)   Resp 16   Ht 5' 2\" (1.575 m)   Wt 126 lb 5.2 oz (57.3 kg)   LMP 2015 (Exact Date)   SpO2 100%   BMI 23.10 kg/m²   Temp (24hrs), Av.4 °F (35.8 °C), Min:95.4 °F (35.2 °C), Max:98.4 °F (36.9 °C)    No results for input(s): POCGLU in the last 72 hours.     Intake/Output Summary (Last 24 hours) at 12/30/2021 2555  Last data filed at 12/30/2021 0931  Gross per 24 hour   Intake 1300 ml   Output 445 ml   Net 855 ml       General Appearance:  alert, well appearing, and in no acute distress  Mental status: oriented to person, place, and time with normal affect  Head:  normocephalic, atraumatic. Eye: no icterus, redness, pupils equal and reactive, extraocular eye movements intact, conjunctiva clear  Ear: normal external ear, no discharge, hearing intact  Nose:  no drainage noted  Mouth: mucous membranes moist  Neck: supple, no carotid bruits, thyroid not palpable  Lungs: Bilateral equal air entry, clear to ausculation, no wheezing, rales or rhonchi, normal effort  Cardiovascular: normal rate, regular rhythm, no murmur, gallop, rub. Abdomen: Soft, nontender, nondistended, normal bowel sounds, no hepatomegaly or splenomegaly  Neurologic: There are no new focal motor or sensory deficits, normal muscle tone and bulk, no abnormal sensation, normal speech, cranial nerves II through XII grossly intact; right leg not tested; has involuntary movements of bue  Skin: No gross lesions, rashes, bruising or bleeding on exposed skin area  Extremities:  peripheral pulses palpable, no pedal edema or calf pain with palpation  Psych: normal affect     Investigations:      Laboratory Testing:  No results found for this or any previous visit (from the past 24 hour(s)). Imaging/[facility]:  XR TIBIA FIBULA RIGHT (2 VIEWS)    Result Date: 12/29/2021  Right tibia fibula: Interval performance of operative reduction and internal fixation of distal tibial and fibular fractures with placement of hardware. No hardware complication. Fracture alignment anatomic. Ankle mortise is uniform. Persistent soft tissue swelling is noted. No joint dislocation. Right ankle: Interval performance of ORIF of distal tibial and fibular fractures with hardware placement, intact. Fracture alignment is satisfactory.   Persistent soft tissue swelling around the ankle. Ankle mortise is uniform. Talar dome and talar walls are intact. XR ANKLE RIGHT (MIN 3 VIEWS)    Result Date: 12/29/2021  Right tibia fibula: Interval performance of operative reduction and internal fixation of distal tibial and fibular fractures with placement of hardware. No hardware complication. Fracture alignment anatomic. Ankle mortise is uniform. Persistent soft tissue swelling is noted. No joint dislocation. Right ankle: Interval performance of ORIF of distal tibial and fibular fractures with hardware placement, intact. Fracture alignment is satisfactory. Persistent soft tissue swelling around the ankle. Ankle mortise is uniform. Talar dome and talar walls are intact.        Assessment :      Hospital Problems           Last Modified POA    * (Principal) S/p tibial fracture 12/30/2021 Yes    Multiple sclerosis, relapsing-remitting ([facility] [address] [address] 12/30/2021 Yes    Tremor, essential 12/30/2021 Yes    Closed fracture of shaft of right tibia 12/29/2021 Yes          Plan:     [id number] [address] to ID home per IM  Needs to stay on oral steroid dosing  Cont other home meds  dvt prophylaxis per ortho    Consultations:   IP CONSULT TO HOSPITALIST  IP CONSULT TO SOCIAL WORK  INPATIENT CONSULT TO ORTHOTIST/PROSTHETIST      Megan Murphy,   12/30/2021  9:37 AM    Copy sent to Dr. Jess Mason, APRN - CNP

## 2021-12-30 NOTE — PROGRESS NOTES
Pt discharged via wheelchair with all belongings, scripts and discharge paperwork. Follow up appointments and discharge instructions reviewed with pt and care giver. All questions answered to satisfaction.

## 2021-12-30 NOTE — PROGRESS NOTES
Occupational Therapy   Occupational Therapy Initial Assessment  Date: 2021   Patient Name: Daphne Delong  MRN: [id number]     : 1975    Date of Service: 2021    Discharge Recommendations:  Patient would benefit from continued therapy after discharge       Assessment   Performance deficits / Impairments: Decreased functional mobility ; Decreased ADL status; Decreased endurance;Decreased high-level IADLs;Decreased balance;Decreased safe awareness;Decreased strength;Decreased coordination;Decreased cognition  Prognosis: Good  Decision Making: Medium Complexity  OT Education: OT Role;Plan of Care;Precautions; ADL Adaptive Strategies;Transfer Training;Energy Conservation;Equipment; Family Education (WBAT RLE, Use of CAM Boot, Safety with Transfers/Use of AD, Use of DME, ADL/IADL/Home Safety; good return)  REQUIRES OT FOLLOW UP: Yes  Activity Tolerance  Activity Tolerance: Patient limited by fatigue;Patient limited by pain  Safety Devices  Safety Devices in place: Yes  Type of devices: Nurse notified;Call light within reach (left in wheelchair with RN aware)  Restraints  Initially in place: No           Patient Diagnosis(es): The encounter diagnosis was S/p tibial fracture. has a past medical history of Abnormal Pap smear, Abnormal Pap smear of cervix, Anxiety, Asthma, Depression, Migraine, Multiple sclerosis ([address] and Post-traumatic stress syndrome. has a past surgical history that includes Colposcopy; [place] tooth extraction; Corneal transplant (Bilateral); Tear duct surgery (Bilateral); Eye surgery; Hysterectomy; and Tibia fracture surgery (Right).            Restrictions  Restrictions/Precautions  Restrictions/Precautions: Weight Bearing,Fall Risk  Required Braces or Orthoses?: Yes  Lower Extremity Weight Bearing Restrictions  Right Lower Extremity Weight Bearing: Weight Bearing As Tolerated  Required Braces or Orthoses  Right Lower Extremity Brace: Boot (CAM boot)  Position Activity Restriction  Other position/activity restrictions: s/p tibia fibula intramedullary nailing and internal fixation with Dr Nolan Ramos, POD#1    Subjective   General  Patient assessed for rehabilitation services?: Yes  Family / Caregiver Present: Yes (significant other)  General Comment  Comments: RN ok'd for therapy this AM. Pt agreeable to participate in session and pleasant/cooperative throughout. Patient Currently in Pain: Yes  Pain Assessment  Pain Assessment: 0-10  Pain Level: 5  Pain Type: Acute pain;Surgical pain  Pain Location: Ankle  Pain Orientation: Right  Pain Descriptors: Aching;Discomfort  Pain Frequency: Intermittent  Functional Pain Assessment: Prevents or interferes some active activities and ADLs  Non-Pharmaceutical Pain Intervention(s): Ambulation/Increased Activity; Distraction; Emotional support;Repositioned  Response to Pain Intervention: Patient Satisfied  Vital Signs  Patient Currently in Pain: Yes     Social/Functional History  Social/Functional History  Lives With: Significant other  Type of Home: Mobile home  Home Layout: One level  Home Access: Stairs to enter with rails (significant other has been carrying pt up/down the stairs since initial tib/fib fx)  Entrance Stairs - Number of Steps: 3+1  Entrance Stairs - Rails: Right  Bathroom Shower/Tub: Tub/Shower unit  Bathroom Toilet: Standard  Bathroom Equipment: Tub transfer bench  Bathroom Accessibility: Accessible  Home Equipment: 4 wheeled [place] walker,Wheelchair-manual (transport chair)  [facility] From: Family  ADL Assistance: [address]: Needs assistance  Homemaking Responsibilities: Yes (significant other performs majority)  Meal Prep Responsibility: Secondary (pt reports simple meal prep only)  Health Care Management: Primary  Ambulation Assistance: Independent (wall/furniture walking for household distances, significant other assisting with mobility since tib/fib fracture ~1wk ago including carrying pt as needed)  Transfer Assistance: Independent (to perform stand pivot transfers)  Active : No  Patient's  Info: significant other drives  Mode of Transportation: Car,Family  Occupation: On [address]: Enjoys drawing/art  Additional Comments: Pt's significant other reports his work schedule is flexible and he is able to assist her when needed. Objective   Vision: Within Functional Limits  Hearing: Within functional limits    Orientation  Overall Orientation Status: Within Functional Limits        Balance  Sitting Balance: Stand by assistance (seated EOB, seated on toilet, seated up in wheelchair)  Standing Balance: Moderate assistance  Standing Balance  Time: ~6-7 minutes total, pt requires a seated rest break following ~2-3 minute bouts of standing  Activity: functional mobility to/from bathroom, toileting tasks, LB dressing tasks, stand pivot transfer from EOB > wheelchair  Comment: mod A with use of RW; moderately unsteady with decreased ability to tolerate WBing through RLE; increased time/effort to perform with mod VCs for RW mngt, sequencing, and safety awareness; noted fatigue following minimal exertion    Toilet Transfers  Toilet - Technique: Ambulating (with use of RW)  Equipment Used: Standard toilet (with use of grab bars)  Toilet Transfer: Moderate assistance     ADL  Feeding: Increased time to complete  Grooming: Increased time to complete;Contact guard assistance  UE Bathing: Increased time to complete;Minimal assistance  LE Bathing: Increased time to complete; Moderate assistance  UE Dressing: Increased time to complete;Minimal assistance (to [facility] gown and don pullover shirt and sweatshirt while seated unsupported at EOB)  LE Dressing: Increased time to complete; Moderate assistance (to don L sock and shoe while seated unsupported, to thread underpants/pants while seated unsupported, max A to pull up underpants/pants over bottom)  Toileting: Increased time to 1331)  AM-PAC Inpatient ADL T-Scale Score : 35.96 (12/30/21 1331)  ADL Inpatient CMS 0-100% Score: 53.32 (12/30/21 1331)  ADL Inpatient CMS G-Code Modifier : CK (12/30/21 1331)    Goals  Short term goals  Time Frame for Short term goals: 14 visits  Short term goal 1: Pt will perform ADL tasks with mod IND using AE/DME PRN  Short term goal 2: Pt will perform functional transfers/functional mobility with SBA and use of least restrictive AD  Short term goal 3: Pt will demo 8+ minutes standing tolerance with use of least restrictive AD for increased participation in ADL tasks  Short term goal 4: Pt will independently demo good safety awareness during engagement in all ADLs and functional transfers/functional mobility       Therapy Time   Individual Concurrent Group Co-treatment   Time In 1131         Time Out 1230         Minutes 59         Timed Code Treatment Minutes: 25 Minutes       Jennifer Díaz OTR/L

## 2021-12-30 NOTE — PROGRESS NOTES
Dr Wilder Anderson called and patient did not have the prescription of Eliquis sent to pt's pharmacy. Writer confirmed the medication, the dose and frequency and contacted pt's pharmacy to have the medication ordered per Dr Wilder Anderson.  P and caregiver updated that medication is ready this evening for   Time of this progress note is 1800 on 12/30/2021

## 2021-12-30 NOTE — PROGRESS NOTES
Weight Bearing,Fall Risk  Required Braces or Orthoses?: Yes  Lower Extremity Weight Bearing Restrictions  Right Lower Extremity Weight Bearing: Weight Bearing As Tolerated  Required Braces or Orthoses  Right Lower Extremity Brace: Boot (CAM boot)  Position Activity Restriction  Other position/activity restrictions: s/p tibia fibula intramedullary nailing and internal fixation with Dr Tesha Michelle, POD#1  Vision/Hearing  Vision: Within Functional Limits  Hearing: Within functional limits     Subjective  General  Patient assessed for rehabilitation services?: Yes  Response To Previous Treatment: Not applicable  Family / Caregiver Present: Yes (significant other)  Follows Commands: Within Functional Limits  Subjective  Subjective: Pt supine in bed and agreeable to therapy this morning. RN agreeable to therapy. Pt reports 2/10 pain today in right ankle. Pain Screening  Patient Currently in Pain: Yes  Pain Assessment  Pain Assessment: 0-10  Pain Level: 2  Pain Type: Acute pain;Surgical pain  Pain Location: Ankle  Pain Orientation: Right  Pain Descriptors: Aching;Discomfort  Functional Pain Assessment: Prevents or interferes some active activities and ADLs  Non-Pharmaceutical Pain Intervention(s): Ambulation/Increased Activity; Distraction;Repositioned  Response to Pain Intervention: Patient Satisfied  Vital Signs  Patient Currently in Pain: Yes       Orientation  Orientation  Overall Orientation Status: Within Functional Limits  Social/Functional History  Social/Functional History  Lives With: Significant other  Type of Home: Mobile home  Home Layout: One level  Home Access: Stairs to enter with rails (significant other has been carrying pt up/down the stairs since initial tib/fib fx)  Entrance Stairs - Number of Steps: 3+1  Entrance Stairs - Rails: Right  Bathroom Shower/Tub: Tub/Shower unit  Bathroom Toilet: Standard  Bathroom Equipment: Tub transfer bench  Bathroom Accessibility: Accessible  Home Equipment: 4 wheeled Justyn Pulliam (transport chair)  Receives Help From: Family  ADL Assistance: Independent  Homemaking Assistance: Needs assistance  Homemaking Responsibilities: Yes (significant other performs majority)  Meal Prep Responsibility: Secondary (pt reports simple meal prep only)  Health Care Management: Primary  Ambulation Assistance: Independent (wall/furniture walking for household distances, significant other assisting with mobility since tib/fib fracture ~1wk ago including carrying pt as needed)  Transfer Assistance: Independent (to perform stand pivot transfers)  Active : No  Patient's  Info: significant other drives  Mode of Transportation: Car,Family  Occupation: On disability  Leisure & Hobbies: Enjoys drawing/art  Additional Comments: Pt's significant other reports his work schedule is flexible and he is able to assist her when needed. Cognition   Cognition  Overall Cognitive Status: Exceptions  Safety Judgement: Decreased awareness of need for safety;Decreased awareness of need for assistance  Insights: Decreased awareness of deficits  Sequencing: Requires cues for some    Objective          AROM RLE (degrees)  RLE AROM: WFL  RLE General AROM: ankle not tested- in CAM boot  AROM LLE (degrees)  LLE AROM : WFL  AROM RUE (degrees)  RUE AROM : WFL  AROM LUE (degrees)  LUE AROM : WFL  Strength RLE  Comment: not formally assessed secondary to fatigue level; WFL for mobility  Strength LLE  Comment: not formally assessed secondary to fatigue level; WFL for mobility  Strength RUE  Comment: Co evaluaiton with OT-see OT evaluation for full UE assessment  Strength LUE  Comment: Co evaluaiton with OT-see OT evaluation for full UE assessment        Bed mobility  Supine to Sit: Moderate assistance (Increased time, verbal cues)  Sit to Supine:  (Retired to w/c at end of session)  Scooting: Minimal assistance  Transfers  Sit to Stand:  Moderate Assistance (stood at RW twice)  Stand to sit: Moderate Assistance  Bed to Chair: Minimal assistance (Pivot transfer to w/c with bilateral UE support on w/c for transfer)  Ambulation  Ambulation?: Yes  More Ambulation?: No  Ambulation 1  Surface: level tile  Device: Rolling Walker  Assistance:  Moderate assistance (Second person present for safety- chair follow would be necessary for distance gait)  Quality of Gait: ataxic, periods of bilateral LE swing through gait- cues for only one LE swing through at a time, reliance on UE support, difficulty negotiating walker, narrow LUZ MARINA  Gait Deviations: Decreased step length;Deviated path  Distance: 12ftx2  Stairs/Curb  Stairs?: No     Balance  Posture: Fair  Sitting - Static: Fair;+  Sitting - Dynamic: Fair  Standing - Static: Fair  Standing - Dynamic: Fair;-  Comments: standing balance assessed with bianca UE support on RW or on w/c arm rests        Plan   Plan  Times per week: 1-2x per day; 6x per week  Times per day: Daily  Current Treatment Recommendations: Strengthening,Transfer Training,Balance Training,Functional Mobility Training,Endurance Training,Gait Training,Stair training,Neuromuscular Re-education,Patient/Caregiver Education & Training,Home Exercise Program,Safety Education & Training  Safety Devices  Type of devices: Call light within reach,Gait belt,Left in chair,Nurse notified  Restraints  Initially in place: No      AM-PAC Score  AM-PAC Inpatient Mobility Raw Score : 14 (12/30/21 1310)  AM-PAC Inpatient T-Scale Score : 38.1 (12/30/21 1310)  Mobility Inpatient CMS 0-100% Score: 61.29 (12/30/21 1310)  Mobility Inpatient CMS G-Code Modifier : CL (12/30/21 1310)          Goals  Short term goals  Time Frame for Short term goals: 14 visits  Short term goal 1: Pt to ambulate 50ft CGA with RW  Short term goal 2: Pt to sit <> stand and stand pivot transfer supervision  Short term goal 3: Pt to demonstrate supervision bed mobility  Short term goal 4: Pt to demonstrate good seated and fair + standing balance to decrease risk of falls  Short term goal 5: Pt to ascend/descend four steps min A with use of one hand rail       Therapy Time   Individual Concurrent Group Co-treatment   Time In 7398         Time Out 1230         Minutes 59         Timed Code Treatment Minutes: [address] Tano Coburn, PT

## 2022-01-04 ENCOUNTER — OFFICE VISIT (OUTPATIENT)
Dept: NEUROLOGY | Age: 47
End: 2022-01-04
Payer: COMMERCIAL

## 2022-01-04 VITALS
SYSTOLIC BLOOD PRESSURE: 125 MMHG | WEIGHT: 109 LBS | DIASTOLIC BLOOD PRESSURE: 78 MMHG | HEART RATE: 96 BPM | HEIGHT: 62 IN | TEMPERATURE: 97.2 F | BODY MASS INDEX: 20.06 KG/M2

## 2022-01-04 DIAGNOSIS — G35 MULTIPLE SCLEROSIS, RELAPSING-REMITTING (HCC): Primary | ICD-10-CM

## 2022-01-04 DIAGNOSIS — G25.0 TREMOR, ESSENTIAL: ICD-10-CM

## 2022-01-04 DIAGNOSIS — G82.20 LOWER PARAPLEGIA (HCC): ICD-10-CM

## 2022-01-04 PROCEDURE — 99214 OFFICE O/P EST MOD 30 MIN: CPT | Performed by: PSYCHIATRY & NEUROLOGY

## 2022-01-04 RX ORDER — GABAPENTIN 100 MG/1
100 CAPSULE ORAL 3 TIMES DAILY
COMMUNITY

## 2022-01-04 NOTE — PROGRESS NOTES
NEUROLOGY FOLLOW-UP VISIT   Patient Name:       Shirin Villegas  :        1975  Clinic Visit Date:    2022  LOV: 21        Dear Dr. Uri Vergara, APRN - CNP     I saw Ms. Shirin Villegas in follow-up in the office today in continuation of neurologic care. As you know she  is a 40 y.o. right handed female initially seen in neurology consultation on 10/20/2021 for establishment of care for known diagnosis of multiple sclerosis since . She is accompanied to the clinic by  Tato Jenkins, significant other. Today is the second follow-up visit. She has received Iv steroids about 3-4 weeks ago and since then she has been feeling \"much better and able to walk better\". Then she had a setback and she had a fall. It occurred 2 weeks ago resulting in fracture of right tibia and right fibula. She underwent surgical intervention with \"nail fixation\". Otherwise, she offers no new complaints. She also stated that she wants to continue primidone as it has been keeping her tremor under control. Since last visit; patient was called and discussed about highly effective therapeutic agents including IV eculizumab or natalizumab or alemtuzumab or cladribine, etc.  She was inclining towards IV eculizumab as per earlier discussions. But later, after reviewing other agents again; patient chose the option of IV Lemtrada (alemtuzumab). Then patient was also informed about the necessity of continuation of care while on this chemotherapy drug, IV Lemtrada. She wanted to establish care with MS specialist who would be interested in taking care of those patients on alemtuzumab therapy. During the initial visit on 10/20/2021: She reported that she was having symptoms of MS since . But she was diagnosed with relapsing remitting MS in . Her initial symptoms consisted of ambulation difficulties with vision changes for which she was on IV Solu-Medrol.  She has had pulse steroid therapy for few times and then she was on Copaxone for 3 years. She had suffered from side effects with blurred vision. Then it was switched to Vumerity capsules; took it for 2 weeks. At that time her dad  of Covid vaccine. Because of grief; she didn't follow-up; never continued Vumerity. Patient is transitioning from the care of her primary neurologist at the [facility], who retired recently. Presently she is having extreme difficulty walking and her problems had been getting worse for the past few weeks. She also has been having bladder dysfunction with urgency. She also has fatigue and tremulousness with abnormal involuntary movements. Also has speech difficulty along with ongoing headaches with photophobia. Also has memory difficulties; trouble remembering recent conversations, etc.  Admits to anxiety and depression but denied suicidal ideations. DISEASE SUMMARY  Date of onset:  (initial sx: optic neuritis)  Date of diagnosis of MS:   Disease course at onset: Relapsing-Remitting  Current disease course: Relapsing-Remitting  Previous disease therapies: Copaxone (three times weekly) ( - )(caused loss of vision) ; Vumerity capsules (1st 2 wks of May 2021)(no side effects; stopped b/o dad  at that time)  Current disease therapy: none  CSF: (10/3/2016); RBC 1, WBC 2, Protein 41, VDRL -ve; MBP 4.55, IgG syn rate 60.7 ( 0-3.2)  IgG index 2.58 (0-0.7). , OCB present in csf but not in serum; ? #   JCV serology result and date: MIREYA Virus +ve with 2.53 index (21) +ve with Index value 2.56 (12/15/21)  Vitamin D: > 120 (21)  Smoking status: none  EDSS: 7.5 consisting of wheelchair-bound; unable to take few steps; may need to help with transferring; severe ataxia; bladder dysfunction; decreased visual acuity and mild fatigue with anxiety/depression. 9HPT: 1 minute 33.47  T25W: could not do it.       Review of systems done by staff reviewed and pertinent positives include Weakness, numbness, walking difficulty attributed to recent fall and resultant tibial and fibular #. Current Outpatient Medications on File Prior to Visit   Medication Sig Dispense Refill    oxyCODONE-acetaminophen (PERCOCET) 5-325 MG per tablet Take 1 tablet by mouth every 6 hours as needed for Pain for up to 7 days. Do not exceed 3g of Acetaminophen in 24 hrs. 20 tablet 0    apixaban (ELIQUIS) 2.5 MG TABS tablet Take 1 tablet by mouth 2 times daily 84 tablet 0    ALPRAZolam (XANAX) 0.5 MG tablet Take 0.5 mg by mouth nightly as needed.  primidone (MYSOLINE) 50 MG tablet Take one tab nightly 90 tablet 1    Cholecalciferol (VITAMIN D3) 2209621 UNIT/GM LIQD 4 ml in AM, 2 ml in afternoon and 4 ml HS      Amphetamine (ADZENYS XR-ODT PO) Take by mouth      PROAIR  (90 BASE) MCG/ACT inhaler       HYDROcodone-acetaminophen (NORCO) 5-325 MG per tablet       butalbital-acetaminophen-caffeine (FIORICET, ESGIC) per tablet Take 1 tablet by mouth as needed for Pain.  Fluticasone Propionate  HFA (FLOVENT HFA IN) Inhale  into the lungs. No current facility-administered medications on file prior to visit. Allergies: Shirin Villegas is allergic to latex, red dye, scopolamine, asa [aspirin], brexpiprazole, duloxetine, naproxen, nitrofurantoin, and other.     Past Medical History:   Diagnosis Date    Abnormal Pap smear     unsure when  or what    Abnormal Pap smear of cervix 10/21/15     LSIL (-)HPV    Anxiety     Asthma     Depression     Migraine     Multiple sclerosis (HCC)     Post-traumatic stress syndrome        Past Surgical History:   Procedure Laterality Date    ANKLE FRACTURE SURGERY Right 12/29/2021    RIGHT TIB FIB OPEN REDUCTION INTERNAL FIXATION WITH CHRISTOPHER performed by Michela Parr MD at [address] Bilateral     EYE SURGERY      stye removal    HYSTERECTOMY      TEAR DUCT SURGERY Bilateral     TIBIA FRACTURE SURGERY Right     WISDOM TOOTH EXTRACTION       Social History: Devendra Kinsey  reports that she has never smoked. She has never used smokeless tobacco. She reports previous alcohol use. She reports that she does not use drugs. Family History   Problem Relation Age of Onset   [de-identified] Cancer Mother         bile duct with mets everywhere    Diabetes Mother     High Blood Pressure Mother     Clotting Disorder Mother     Heart Disease Father         CHF    Diabetes Father     COPD Father     Clotting Disorder Father     Breast Cancer Maternal Grandmother     High Blood Pressure Maternal Grandmother     Tuberculosis Paternal Grandmother     Cancer Paternal Grandfather     Heart Disease Paternal Grandfather         CHF    COPD Maternal Grandfather      On exam: Blood pressure 125/78, pulse 96, temperature 97.2 °F (36.2 °C), height 5' 2\" (1.575 m), weight 109 lb (49.4 kg), last menstrual period 11/27/2015, not currently breastfeeding. NEUROLOGIC EXAMINATION  GENERAL  Appears comfortable and in no distress   HEENT  NC/ AT   NECK  Supple and no bruits heard   MENTAL STATUS:  Alert, oriented, intact memory, no confusion, normal speech, normal language, no hallucination or delusion; appropriate affect   CRANIAL NERVES: II     -      PERRLA, decreased visual acuity bilaterally; Fundi reveal intact venous pulsations;   Visual fields intact to confrontation  III,IV,VI -  EOMs full, no afferent defect, no STEVEN, no ptosis  V     -     Diminished LT, PP on rt face   VII    -     Normal facial symmetry  VIII   -     Intact hearing  IX,X -     Symmetrical palate  XI    -     Symmetrical shoulder shrug  XII   -     Midline tongue, no atrophy    MOTOR FUNCTION:  significant for good strength of grade 5/5 in both upper and left lower extremities; limited motor exam in Rt LE with \"cam boot\" s/p Rt tibia and Rt fibula #   SENSORY FUNCTION:  impaired LT, PP, Temp and vibration in rt side of face, arm and leg   CEREBELLAR FUNCTION:  severe dysmetria bilaterally; Rt >> Lt    REFLEX FUNCTION:  Symmetric, no perverted reflex, equivocal on left, flexor on right   STATION and GAIT  needed help for stance and could not walk without 2 person assist; wheel chair bound       Lab Results   Component Value Date    WBC 5.7 12/23/2021    HGB 13.4 12/23/2021    HCT 40.8 12/23/2021    MCV 94.7 12/23/2021     12/23/2021    LYMPHOPCT 46 (H) 12/23/2021    RBC 4.31 12/23/2021    MCH 31.1 12/23/2021    MCHC 32.8 12/23/2021    RDW 12.3 12/23/2021         Diagnostic data reviewed with the patient:    Neuroimaging studies done at [facility] reviewed:    Cervical spine MRI (with/without) 2/1/2021: High signal foci throughout cervical spinal cord consistent with multiple sclerosis. No associated enhancement. Brain MRI (with/without) 1/31/2021: Extensive periventricular and deep cerebral white matter signal abnormalities; patchy T2/flair hyperintense signal abnormalities in brainstem and medial thalami which were not seen on prior study. No contrast-enhancement. MRI brain (with/without) 11/10/2021: Innumerable prominent demyelinating plaques in brain consistent with multiple sclerosis; none demonstrate enhancement to indicate active demyelination. MRI cervical spine (with/without) 11/10/2021: Multiple foci of cord signal abnormality at C2, C4, C5-C6 and C7-T1 suggestive of demyelinating lesion. MRI thoracic spine with and without contrast (11/10/2021): Innumerable small T2 hyperintense foci in the cord consistent with multiple sclerosis; none demonstrate enhancement to indicate active demyelination. Blood work from [facility] reviewed:  CBC 6/11/2021: WBC 6.7, lymphocytes 25.6%. ,  ALC 1.7 (1.5-3.5  BMP 6/11/2021: Sodium 139, BUN 12, creatinine 0.57, glucose 89    Vitamin D 25 1/14/2021: >120  Vitamin B12 1/14/2021: >1500  MILEY 1/14/2021: Negative    Hepatitis BE antibody (9/23/2021): Negative  Hepatitis B core antibody (9/23/2021): Negative.   Hepatitis B surface antigen (9/23/2021): Negative  Mycobacterium TB by QuantiFERON gold (9/23/2021): Negative  Varicella-zoster antibody, IgG (9/23/2021): Positive at 4.9 (<0.9)        No results found for: Liseth Goins  Lab Results   Component Value Date    AGEROENZ56 995 11/07/2021    VITAMINB6 39.0 11/07/2021      Lab Results   Component Value Date    FOLATE 13.3 11/07/2021     Lab Results   Component Value Date    VITD25 95.2 11/07/2021         MIREYA virus testing (12/15/2021):   JCV antibody: Positive; Index value 2.56        Impression and Plan: Ms. Shabbir Woods is a 40 y.o. female with   Relapsing remitting multiple sclerosis; off of DMT since 2016; Reviewed all the available options of highly effective therapeutic agents; she chose Alemtuzumab  Dosing/discussion: She understood that she will be getting it only for 2 yr; during 1st yr- 12 mg IV daily for 5 days, followed 1yr later by 12 mg IV daily for 3 days. Close monitoring with following up on CBC with differential, coag panel incl PT/PTT,  UA, BUN/cr, LFTs, TSH, etc. along with monitoring for signs/symptoms of infection and skin exams for melanoma, etc.  She will be closely following up with MS Specialists who are interested in pursuing the care of MS patients on Alemtuzumab and other REMS drugs. Patient was clearly informed that we can refer her to Dr. Franny Delarosa's group or whomever, she is interested in taking care of those patients. Patient stated that she already has been in touch with Dr. Brandon [facility] office. Surveillance brain & spine MRIs; done 2 months ago; were reviewed with patient; results as above. Exertional tremor; to continue Primidone at present dose. Follow up in clinic prn. This note was partially created using voice recognition software and is inherently subject to errors including those of syntax and \"sound alike\" substitutions which may escape proofreading.   In such instances, original meaning may be extrapolated by contextual

## 2022-01-04 NOTE — PROGRESS NOTES
9+6Constitutional [] Weight loss/gain   [] Fatigue  [] Fever/Chills   HEENT [] Hearing Loss  [] Visual Disturbance  [] Tinnitus  [] Eye pain   Respiratory [] Shortness of Breath  [] Cough  [] Snoring   Cardiovascular [] Chest Pain  [] Palpitations  [] Lightheaded   GI [] Constipation  [] Diarrhea  [] Swallowing change  [] Nausea/vomiting    [] Urinary Frequency  [] Urinary Urgency   Musculoskeletal [] Neck pain  [] Back pain  [] Muscle pain  [] Restless legs   Dermatologic [] Skin changes   Neurologic [] Memory loss/confusion  [] Seizures  [] Trouble walking or imbalance  [] Dizziness  [] Sleep disturbance  [] Weakness  [] Numbness  [] Tremors  [] Speech Difficulty  [] Headaches  [] Light Sensitivity  [] Sound Sensitivity   Endocrinology []Excessive thirst  []Excessive hunger   Psychiatric [] Anxiety/Depression  [] Hallucination   Allergy/immunology []Hives/environmental allergies   Hematologic/lymph [] Abnormal bleeding  [] Abnormal bruising

## 2022-01-11 ENCOUNTER — OFFICE VISIT (OUTPATIENT)
Dept: ORTHOPEDIC SURGERY | Age: 47
End: 2022-01-11

## 2022-01-11 VITALS — BODY MASS INDEX: 20.06 KG/M2 | HEIGHT: 62 IN | RESPIRATION RATE: 12 BRPM | WEIGHT: 109 LBS

## 2022-01-11 DIAGNOSIS — S82.201D CLOSED FRACTURE OF SHAFT OF RIGHT TIBIA WITH ROUTINE HEALING, UNSPECIFIED FRACTURE MORPHOLOGY, SUBSEQUENT ENCOUNTER: Primary | ICD-10-CM

## 2022-01-11 PROCEDURE — 99024 POSTOP FOLLOW-UP VISIT: CPT | Performed by: ORTHOPAEDIC SURGERY

## 2022-01-11 NOTE — PROGRESS NOTES
[address] [place] [address] AND SPORTS MEDICINE  [address] [address]  Dept: [phone]    Ambulatory Orthopedic Postoperative Visit     Preoperative Diagnosis:   1. Right tibia fracture  2. Right fibula fracture  3. History of multiple sclerosis     Postoperative Diagnosis:   1. Same as above     Procedures Performed:  (12/29/2021)  1. Right tibia fracture open treatment with intramedullary nail   2. Right fibula fracture open treatment with internal fixation         SUBJECTIVE:     The patient returns post op from the above stated procedure. Reports doing well overall, reports improved pain, denies wound drainage/issues, fevers/chills/night sweats, calf swelling/pain, chest pain, shortness of breath. OBJECTIVE:  Resp 12   Ht 5' 2\" (1.575 m)   Wt 109 lb (49.4 kg)   LMP 11/27/2015 (Exact Date)   BMI 19.94 kg/m²    NAD, resting comfortably  Incisions clean/dry/intact, no erythema/dehiscence/drainage  Sensation to light touch grossly intact throughout  Warm and well perfused  Grossly neurovascularly intact distally  No signs of infection  No calf swelling/tenderness      RADIOLOGY:   1/11/2022 No new radiology images today. Prior images reviewed for reference. ASSESSMENT AND PLAN:     2 weeks s/p above, doing well overall        She has a history of a right displaced spiral fracture of the distal tibia shaft with distal fibula fracture, sustained on 12/20/2021.       Notably, she has a complex past medical history.  She has a history of MS and chronic oral steroid use. She does have a history of right-sided weakness, and does ambulate independently, using a walker and/or furniture for assistance.           [x]  Sutures/staples were removed and steri-strips applied          Precautions:  Weight bearing as tolerated on the Right lower extremity             -             [x]?   Physical Therapy/Home exercises    Immobilization:      []? Splint/Cast               [x]? CAM boot                    []?  Comfortable shoe    []? Other:                DVT ppx:   [x]? Early mobilization              [x]? Medication as prescribed (Eliquis)                      []?  No chemical ppx needed; mechanical only             -     Pain control:  Medication as prescribed (dosing and quantity) indicated for acute postoperative pain control             -     Special concerns:       []?            [x]? Avoid strenuous activity/pain provoking maneuvers and high-impact repetitive exercises       All questions were answered and the patient agrees with the above plan. The patient will return to clinic in 4 weeks with right tibia and ankle x-rays         No follow-ups on file. No orders of the defined types were placed in this encounter. No orders of the defined types were placed in this encounter. Kishor Goode MD  Orthopedic Surgery        Please excuse any typos/errors, as this note was created with the assistance of voice recognition software. While intending to generate a document that actually reflects the content of the visit, the document can still have some errors including those of syntax and sound-a-like substitutions which may escape proof reading. In such instances, actual meaning can be extrapolated by context.

## 2022-02-02 DIAGNOSIS — S82.201D CLOSED FRACTURE OF SHAFT OF RIGHT TIBIA WITH ROUTINE HEALING, UNSPECIFIED FRACTURE MORPHOLOGY, SUBSEQUENT ENCOUNTER: Primary | ICD-10-CM

## 2022-02-08 ENCOUNTER — OFFICE VISIT (OUTPATIENT)
Dept: ORTHOPEDIC SURGERY | Age: 47
End: 2022-02-08

## 2022-02-08 VITALS — RESPIRATION RATE: 12 BRPM | WEIGHT: 109 LBS | HEIGHT: 62 IN | BODY MASS INDEX: 20.06 KG/M2

## 2022-02-08 DIAGNOSIS — S82.201D CLOSED FRACTURE OF SHAFT OF RIGHT TIBIA WITH ROUTINE HEALING, UNSPECIFIED FRACTURE MORPHOLOGY, SUBSEQUENT ENCOUNTER: Primary | ICD-10-CM

## 2022-02-08 PROCEDURE — 99024 POSTOP FOLLOW-UP VISIT: CPT | Performed by: ORTHOPAEDIC SURGERY

## 2022-02-08 NOTE — PROGRESS NOTES
[address] [place] [address] AND SPORTS MEDICINE  [address] [address]  Dept: [phone]    Ambulatory Orthopedic Postoperative Visit     Preoperative Diagnosis:   1. Right tibia fracture  2. Right fibula fracture  3. History of multiple sclerosis     Postoperative Diagnosis:   1. Same as above     Procedures Performed:  (12/29/2021)  1. Right tibia fracture open treatment with intramedullary nail   2. Right fibula fracture open treatment with internal fixation         SUBJECTIVE:     The patient returns post op from the above stated procedure. Reports doing well overall, reports improved pain, denies wound drainage/issues, fevers/chills/night sweats, calf swelling/pain, chest pain, shortness of breath. She denies any pain with weightbearing, but they report that she has been limited weightbearing. OBJECTIVE:  Resp 12   Ht 5' 2\" (1.575 m)   Wt 109 lb (49.4 kg)   LMP 11/27/2015 (Exact Date)   BMI 19.94 kg/m²    NAD, resting comfortably  Incisions clean/dry/intact, no erythema/dehiscence/drainage  Sensation to light touch grossly intact throughout  Warm and well perfused  Grossly neurovascularly intact distally  No signs of infection  No calf swelling/tenderness  -Tenderness: None      RADIOLOGY:   2/8/2022 FINDINGS:  Three views (AP, Mortise, and Lateral) of the right ankle and and two views (AP and Lateral) of the right tibia were obtained in the office today and reviewed, revealing tibia shaft fracture and distal fibula fracture status post ORIF. Interval healing without interval displacement. IMPRESSION: Fractures as above status post ORIF.     Electronically signed by Susana Cisse MD       ASSESSMENT AND PLAN:     6 weeks s/p above, doing well overall        She has a history of a right displaced spiral fracture of the distal tibia shaft with distal fibula fracture, sustained on 12/20/2021.       Notably, she has a complex past medical history.  She has a history of MS and chronic oral steroid use. She does have a history of right-sided weakness, and does ambulate independently, using a walker and/or furniture for assistance.                 Precautions:  Weight bearing as tolerated on the Right lower extremity             -             [x]? Physical Therapy/Home exercises        Immobilization:      []? Splint/Cast               [x]? CAM boot                    []?  Comfortable shoe    []? Other:                DVT ppx:   [x]? Early mobilization              []?  Medication as prescribed (Eliquis)                      [x]? No chemical ppx needed; mechanical only             -     Pain control:  Medication as prescribed (dosing and quantity) indicated for acute postoperative pain control             -     Special concerns:       []?            [x]? Avoid strenuous activity/pain provoking maneuvers and high-impact repetitive exercises       All questions were answered and the patient agrees with the above plan. The patient will return to clinic in 6 weeks with right tibia and ankle x-rays         No follow-ups on file. No orders of the defined types were placed in this encounter. No orders of the defined types were placed in this encounter. Dora Adkins MD  Orthopedic Surgery        Please excuse any typos/errors, as this note was created with the assistance of voice recognition software. While intending to generate a document that actually reflects the content of the visit, the document can still have some errors including those of syntax and sound-a-like substitutions which may escape proof reading. In such instances, actual meaning can be extrapolated by context.

## 2022-03-18 DIAGNOSIS — S82.201D CLOSED FRACTURE OF SHAFT OF RIGHT TIBIA WITH ROUTINE HEALING, UNSPECIFIED FRACTURE MORPHOLOGY, SUBSEQUENT ENCOUNTER: Primary | ICD-10-CM

## 2022-03-21 NOTE — PROGRESS NOTES
[address] [place] [address] AND SPORTS MEDICINE  [address] [address]  Dept: [phone]    Ambulatory Orthopedic Postoperative Visit     Preoperative Diagnosis:   1. Right tibia fracture  2. Right fibula fracture  3. History of multiple sclerosis     Postoperative Diagnosis:   1. Same as above     Procedures Performed:  (12/29/2021)  1. Right tibia fracture open treatment with intramedullary nail   2. Right fibula fracture open treatment with internal fixation       SUBJECTIVE:     The patient returns post op from the above stated procedure. Reports doing well overall, reports improved pain, denies wound drainage/issues, fevers/chills/night sweats, calf swelling/pain, chest pain, shortness of breath. At today's visit, they (patient and her significant other) report that yesterday on 3/21/2022 is the first time she has put weight on her foot. She denies any pain. She is ambulating today in a wheelchair with a boot. OBJECTIVE:  Resp 16   Ht 5' 2\" (1.575 m)   Wt 109 lb (49.4 kg)   LMP 11/27/2015 (Exact Date)   BMI 19.94 kg/m²    NAD, resting comfortably  Incisions clean/dry/intact, no erythema/dehiscence/drainage  Sensation to light touch grossly intact throughout  Warm and well perfused  Grossly neurovascularly intact distally  No signs of infection  No calf swelling/tenderness  -Tenderness: None      RADIOLOGY:   3/22/2022 FINDINGS:  Three views (AP, Mortise, and Lateral) of the right ankle and and two views (AP and Lateral) of the right tibia were obtained in the office today and reviewed, revealing tibia shaft fracture and distal fibula fracture status post ORIF. Interval healing without interval displacement. IMPRESSION: Fractures as above status post ORIF.     Electronically signed by Silvio Cruz MD       ASSESSMENT AND PLAN:     12 weeks s/p above, doing well overall        She has a history of a right displaced spiral fracture of the distal tibia shaft with distal fibula fracture, sustained on 12/20/2021.       Notably, she has a complex past medical history.  She has a history of MS and chronic oral steroid use. She does have a history of right-sided weakness, and does ambulate independently, using a walker and/or furniture for assistance.         Precautions:  Weight bearing as tolerated on the Right lower extremity--we again discussed the importance of weightbearing             -             [x]? Physical Therapy/Home exercises        Immobilization:      []? Splint/Cast               []?  CAM boot                    [x]? Comfortable shoe--we again discussed that she should stop using the cam boot. []? Other:                DVT ppx:   [x]? Early mobilization              []?  Medication as prescribed (Eliquis)                      [x]? No chemical ppx needed; mechanical only             -     Pain control:  Medication as prescribed (dosing and quantity) indicated for acute postoperative pain control             -     Special concerns:       []?            [x]? Avoid strenuous activity/pain provoking maneuvers and high-impact repetitive exercises       All questions were answered and the patient agrees with the above plan. The patient will return to clinic in 3 months with right tibia and ankle x-rays         No follow-ups on file. No orders of the defined types were placed in this encounter. No orders of the defined types were placed in this encounter. Sedonia Schilder, MD  Orthopedic Surgery        Please excuse any typos/errors, as this note was created with the assistance of voice recognition software. While intending to generate a document that actually reflects the content of the visit, the document can still have some errors including those of syntax and sound-a-like substitutions which may escape proof reading. In such instances, actual meaning can be extrapolated by context.

## 2022-03-22 ENCOUNTER — OFFICE VISIT (OUTPATIENT)
Dept: ORTHOPEDIC SURGERY | Age: 47
End: 2022-03-22

## 2022-03-22 VITALS — HEIGHT: 62 IN | BODY MASS INDEX: 20.06 KG/M2 | WEIGHT: 109 LBS | RESPIRATION RATE: 16 BRPM

## 2022-03-22 DIAGNOSIS — S82.201D CLOSED FRACTURE OF SHAFT OF RIGHT TIBIA WITH ROUTINE HEALING, UNSPECIFIED FRACTURE MORPHOLOGY, SUBSEQUENT ENCOUNTER: Primary | ICD-10-CM

## 2022-03-22 PROCEDURE — 99024 POSTOP FOLLOW-UP VISIT: CPT | Performed by: ORTHOPAEDIC SURGERY

## 2022-06-01 DIAGNOSIS — S82.201D CLOSED FRACTURE OF SHAFT OF RIGHT TIBIA WITH ROUTINE HEALING, UNSPECIFIED FRACTURE MORPHOLOGY, SUBSEQUENT ENCOUNTER: Primary | ICD-10-CM

## 2022-06-02 ENCOUNTER — OFFICE VISIT (OUTPATIENT)
Dept: ORTHOPEDIC SURGERY | Age: 47
End: 2022-06-02
Payer: COMMERCIAL

## 2022-06-02 VITALS — RESPIRATION RATE: 15 BRPM | WEIGHT: 109 LBS | TEMPERATURE: 98.1 F | BODY MASS INDEX: 20.06 KG/M2 | HEIGHT: 62 IN

## 2022-06-02 DIAGNOSIS — S82.201D CLOSED FRACTURE OF SHAFT OF RIGHT TIBIA WITH ROUTINE HEALING, UNSPECIFIED FRACTURE MORPHOLOGY, SUBSEQUENT ENCOUNTER: Primary | ICD-10-CM

## 2022-06-02 DIAGNOSIS — S82.831D CLOSED FRACTURE OF DISTAL END OF RIGHT FIBULA WITH ROUTINE HEALING, UNSPECIFIED FRACTURE MORPHOLOGY, SUBSEQUENT ENCOUNTER: ICD-10-CM

## 2022-06-02 PROCEDURE — 99212 OFFICE O/P EST SF 10 MIN: CPT | Performed by: ORTHOPAEDIC SURGERY

## 2022-06-02 NOTE — PROGRESS NOTES
[address] [place] [address] AND SPORTS MEDICINE  [place] [address]  Dept: [phone]    Ambulatory Orthopedic Follow Up Visit    Preoperative Diagnosis:   1. Right tibia fracture  2. Right fibula fracture  3. History of multiple sclerosis     Postoperative Diagnosis:   1. Same as above     Procedures Performed:  (12/29/2021)  1. Right tibia fracture open treatment with intramedullary nail   2. Right fibula fracture open treatment with internal fixation         CHIEF COMPLAINT:    Chief Complaint   Patient presents with    Ankle Pain     Right         HISTORY OF PRESENT ILLNESS:       The patient is a 40 y.o. female who is being seen for evaluation of the above. Since being seen last, the patient is doing better. At today's visit, she is using a wheelchair. History is obtained today from:   [x]  the patient     []  EMR     [x]  one family member/friend    []  multiple family members/friends    []  other:      She denies any knee pain, but reports some occasional swelling in her ankle. REVIEW OF SYSTEMS:  Musculoskeletal: See HPI for pertinent positives     Past Medical History:    She  has a past medical history of Abnormal Pap smear, Abnormal Pap smear of cervix (10/21/15 ), Anxiety, Asthma, Depression, Migraine, Multiple sclerosis ([facility] [address] and Post-traumatic stress syndrome. Past Surgical History:    She  has a past surgical history that includes Colposcopy; [place] tooth extraction; Corneal transplant (Bilateral); Tear duct surgery (Bilateral); Eye surgery; Hysterectomy; Tibia fracture surgery (Right); and Ankle fracture surgery (Right, 12/29/2021). Current Medications:     Current Outpatient Medications:     gabapentin (NEURONTIN) 100 MG capsule, Take 100 mg by mouth 3 times daily. , Disp: , Rfl:     ALPRAZolam (XANAX) 0.5 MG tablet, Take 0.5 mg by mouth nightly as needed.   (Patient not taking: Reported on 1/4/2022), Disp: , Rfl:     primidone (MYSOLINE) 50 MG tablet, Take one tab nightly, Disp: 90 tablet, Rfl: 1    Cholecalciferol (VITAMIN D3) 0099420 UNIT/GM LIQD, 4 ml in AM, 2 ml in afternoon and 4 ml HS, Disp: , Rfl:     Amphetamine (ADZENYS XR-ODT PO), Take by mouth, Disp: , Rfl:     PROAIR  (90 BASE) MCG/ACT inhaler, , Disp: , Rfl:     butalbital-acetaminophen-caffeine (FIORICET, ESGIC) per tablet, Take 1 tablet by mouth as needed for Pain. (Patient not taking: Reported on 1/4/2022), Disp: , Rfl:     Fluticasone Propionate  HFA (FLOVENT HFA IN), Inhale  into the lungs. , Disp: , Rfl:      Allergies:    Latex, Red dye, Scopolamine, Asa [aspirin], Brexpiprazole, Duloxetine, Naproxen, Nitrofurantoin, and Other    Family History:  family history includes Breast Cancer in her maternal grandmother; COPD in her father and maternal grandfather; Cancer in her mother and paternal grandfather; Clotting Disorder in her father and mother; Diabetes in her father and mother; Heart Disease in her father and paternal grandfather; High Blood Pressure in her maternal grandmother and mother; Tuberculosis in her paternal grandmother.     Social History:   Social History     Occupational History    Not on file   Tobacco Use    Smoking status: Never Smoker    Smokeless tobacco: Never Used   Vaping Use    Vaping Use: Never used   Substance and Sexual Activity    Alcohol use: Not Currently    Drug use: No    Sexual activity: Not Currently       OBJECTIVE:  Temp 98.1 °F (36.7 °C)   Resp 15   Ht 5' 2\" (1.575 m)   Wt 109 lb (49.4 kg)   LMP 11/27/2015 (Exact Date)   BMI 19.94 kg/m²    Psych: awake, alert  Cardio:  well perfused extremities, no cyanosis  Resp:  normal respiratory effort  Musculoskeletal:    Incisions well-healed, no erythema/dehiscence/drainage  Sensation to light touch grossly intact throughout  Warm and well perfused  Grossly neurovascularly intact distally  No signs of infection  No calf swelling/tenderness  -Tenderness: None at the fracture site, hardware, knee      RADIOLOGY:   6/2/2022 FINDINGS:  Three views (AP, Mortise, and Lateral) of the right ankle and and two views (AP and Lateral) of the right tibia were obtained in the office today and reviewed, revealing tibia shaft fracture and distal fibula fracture status post ORIF. Interval healing without interval displacement.     IMPRESSION: Fractures as above status post ORIF.     Electronically signed by Yazmin Iverson MD       ASSESSMENT AND PLAN:  Body mass index is 19.94 kg/m². She is status post the above, doing well overall, but with a slow recovery (she reports that she has not been ambulating much). She has a history of a right displaced spiral fracture of the distal tibia shaft with distal fibula fracture, sustained on 12/20/2021.       Notably, she has a complex past medical history.  She has a history of MS and chronic oral steroid use.  She does have a history of right-sided weakness, and does ambulate independently, using a walker and/or furniture for assistance.       We had a discussion today about the likely diagnosis and its natural history, physical exam and imaging findings, as well as various treatment options in detail. Surgically, we discussed that no further surgical intervention is indicated at this time, as she appears to have healed well. Orders/referrals were placed as below at today's visit. She may continue to advance her activity as tolerated. No immobilization is needed for stability at this time. She may continue weight bearing as tolerated. We discussed she has no specific restrictions. All questions were answered and the above plan was agreed upon. The patient will return to clinic PRN .           At the patient's next visit, depending on how the patient is doing and/or new imaging/labs results, we may consider the following options:    []  Orthotic (OTC)     []  Orthotic (custom) []  Rocker bottom shoes     []  Brace (OTC)        []  Brace (custom)             []  CAM boot        []  Night splint         []  Heel cups        []  Strap      []  Toe sleeves/splints    []  PT:                     []  Wean out of immobilization   []  Advance activity       []  Topical               []  NSAIDs          []  James         []  Referral:         []  Stress xrays       []  CT         []  MRI        []  Injection:         []  Consider OR      []  Pick OR date    No follow-ups on file. No orders of the defined types were placed in this encounter. No orders of the defined types were placed in this encounter. Liborio Gerardo MD  Orthopedic Surgery        Please excuse any typos/errors, as this note was created with the assistance of voice recognition software. While intending to generate a document that actually reflects the content of the visit, the document can still have some errors including those of syntax and sound-a-like substitutions which may escape proof reading. In such instances, actual meaning can be extrapolated by context.

## 2022-06-30 ENCOUNTER — HOSPITAL ENCOUNTER (EMERGENCY)
Facility: CLINIC | Age: 47
Discharge: HOME | End: 2022-06-30
Attending: EMERGENCY MEDICINE
Payer: COMMERCIAL

## 2022-06-30 VITALS
DIASTOLIC BLOOD PRESSURE: 79 MMHG | TEMPERATURE: 98.4 F | WEIGHT: 116 LBS | BODY MASS INDEX: 21.35 KG/M2 | HEIGHT: 62 IN | HEART RATE: 86 BPM | RESPIRATION RATE: 18 BRPM | OXYGEN SATURATION: 98 % | SYSTOLIC BLOOD PRESSURE: 145 MMHG

## 2022-06-30 DIAGNOSIS — H61.22 IMPACTED CERUMEN OF LEFT EAR: Primary | ICD-10-CM

## 2022-06-30 PROCEDURE — 99282 EMERGENCY DEPT VISIT SF MDM: CPT

## 2022-06-30 PROCEDURE — 69209 REMOVE IMPACTED EAR WAX UNI: CPT

## 2022-06-30 ASSESSMENT — PAIN DESCRIPTION - ORIENTATION: ORIENTATION: LEFT

## 2022-06-30 ASSESSMENT — ENCOUNTER SYMPTOMS
TROUBLE SWALLOWING: 0
RESPIRATORY NEGATIVE: 1
GASTROINTESTINAL NEGATIVE: 1
SINUS PAIN: 0
FACIAL SWELLING: 0
SORE THROAT: 0
SINUS PRESSURE: 0
RHINORRHEA: 0
VOICE CHANGE: 0
EYES NEGATIVE: 1

## 2022-06-30 ASSESSMENT — PAIN - FUNCTIONAL ASSESSMENT: PAIN_FUNCTIONAL_ASSESSMENT: 0-10

## 2022-06-30 ASSESSMENT — PAIN DESCRIPTION - PAIN TYPE: TYPE: ACUTE PAIN

## 2022-06-30 ASSESSMENT — PAIN DESCRIPTION - LOCATION: LOCATION: EAR

## 2022-06-30 ASSESSMENT — PAIN DESCRIPTION - FREQUENCY: FREQUENCY: CONTINUOUS

## 2022-06-30 ASSESSMENT — PAIN SCALES - GENERAL: PAINLEVEL_OUTOF10: 8

## 2022-06-30 ASSESSMENT — PAIN DESCRIPTION - DESCRIPTORS: DESCRIPTORS: ACHING

## 2022-06-30 ASSESSMENT — PAIN DESCRIPTION - ONSET: ONSET: ON-GOING

## 2022-07-01 NOTE — ED NOTES
Pt presents to ED c/o left ear pain x3 days. Pt states she has had issues with excessive wax in the past. Pt arrives A/Ox4, PWD, PMS intact. Pt sitting in her wheelchair with significant other at bedside and call light in reach.      Melo Cid RN  06/30/22 [id number]

## 2022-07-01 NOTE — ED PROVIDER NOTES
[facility] ED  [address] [facility]  Phone: [phone]      Attending Physician Attestation    I performed a history and physical examination of the patient and discussed management with the mid level provider. I reviewed the mid level provider's note and agree with the documented findings and plan of care. Any areas of disagreement are noted on the chart. I was personally present for the key portions of any procedures. I have documented in the chart those procedures where I was not present during the key portions. I have reviewed the emergency nurses triage note. I agree with the chief complaint, past medical history, past surgical history, allergies, medications, social and family history as documented unless otherwise noted below. Documentation of the HPI, Physical Exam and Medical Decision Making performed by mid level providers is based on my personal performance of the HPI, PE and MDM. For Physician Assistant/ Nurse Practitioner cases/documentation I have personally evaluated this patient and have completed at least one if not all key elements of the E/M (history, physical exam, and MDM). Additional findings are as noted. CHIEF COMPLAINT       Chief Complaint   Patient presents with    Otalgia         HISTORY OF PRESENT ILLNESS    Sadiq Weiss is a 40 y.o. female who presents with ear pain. Patient has a history of multiple sclerosis with paraplegia. She has had a little bit of a headache with this. Denies any fever or chills. Denies any sore throat cough or congestion. PAST MEDICAL HISTORY    has a past medical history of Abnormal Pap smear, Abnormal Pap smear of cervix, Anxiety, Asthma, Depression, Migraine, Multiple sclerosis ([address] and Post-traumatic stress syndrome. SURGICAL HISTORY      has a past surgical history that includes Colposcopy; [place] tooth extraction; Corneal transplant (Bilateral); Tear duct surgery (Bilateral);  Eye surgery; Hysterectomy; Tibia fracture surgery (Right); and Ankle fracture surgery (Right, 2021). CURRENT MEDICATIONS       Previous Medications    ALPRAZOLAM (XANAX) 0.5 MG TABLET    Take 0.5 mg by mouth nightly as needed. AMPHETAMINE (ADZENYS XR-ODT PO)    Take by mouth    BUTALBITAL-ACETAMINOPHEN-CAFFEINE (FIORICET, ESGIC) PER TABLET    Take 1 tablet by mouth as needed for Pain. CHOLECALCIFEROL (VITAMIN D3) 7346899 UNIT/GM LIQD    4 ml in AM, 2 ml in afternoon and 4 ml HS    FLUTICASONE PROPIONATE  HFA (FLOVENT HFA IN)    Inhale  into the lungs. GABAPENTIN (NEURONTIN) 100 MG CAPSULE    Take 100 mg by mouth 3 times daily. PRIMIDONE (MYSOLINE) 50 MG TABLET    Take one tab nightly    PROAIR  (90 BASE) MCG/ACT INHALER           ALLERGIES     is allergic to latex, red dye, scopolamine, asa [aspirin], brexpiprazole, duloxetine, naproxen, nitrofurantoin, and other. FAMILY HISTORY     She indicated that her mother is . She indicated that her father is alive. She indicated that her maternal grandmother is . She indicated that the status of her maternal grandfather is unknown. She indicated that her paternal grandmother is . She indicated that her paternal grandfather is . family history includes Breast Cancer in her maternal grandmother; COPD in her father and maternal grandfather; Cancer in her mother and paternal grandfather; Clotting Disorder in her father and mother; Diabetes in her father and mother; Heart Disease in her father and paternal grandfather; High Blood Pressure in her maternal grandmother and mother; Tuberculosis in her paternal grandmother. SOCIAL HISTORY      reports that she has never smoked. She has never used smokeless tobacco. She reports previous alcohol use. She reports that she does not use drugs. PHYSICAL EXAM     INITIAL VITALS:  height is 5' 2\" (1.575 m) and weight is 52.6 kg (116 lb). Her oral temperature is 98.4 °F (36.9 °C). Her blood pressure is 145/79 (abnormal) and her pulse is 86. Her respiration is 18 and oxygen saturation is 98%. Initially left ear canal was impacted with cerumen. It was irrigated out. Patient has a good cone of light with no erythema. Tympanic membrane is intact. Patient states she feels a lot better now that the cerumen impaction has been relieved. DIAGNOSTIC RESULTS     EKG: All EKG's are interpreted by the Emergency Department Physician who either signs or Co-signs this chart in the absence of a cardiologist.        RADIOLOGY:   Non-plain film images such as CT, Ultrasound and MRI are read by the radiologist. Plain radiographic images are visualized and the radiologist interpretations are reviewed as follows:         LABS:  No results found for this visit on 06/30/22. EMERGENCY DEPARTMENT COURSE:   Vitals:    Vitals:    06/30/22 2024   BP: (!) 145/79   Pulse: 86   Resp: 18   Temp: 98.4 °F (36.9 °C)   TempSrc: Oral   SpO2: 98%   Weight: 52.6 kg (116 lb)   Height: 5' 2\" (1.575 m)     -------------------------  BP: (!) 145/79, Temp: 98.4 °F (36.9 °C), Heart Rate: 86, Resp: 18      PERTINENT ATTENDING PHYSICIAN COMMENTS:    Had a cerumen impaction that was relieved with irrigation here in the emergency department. She was feeling much better once the irrigation was completed. No signs of an infection. Tympanic membrane is intact. (Please note that portions of this note were completed with a voice recognition program.  Efforts were made to edit the dictations but occasionally words are mis-transcribed. )    Umesh Nayak DO, , MD, F.A.C.E.P.   Attending Emergency Medicine Physician        Peri [place]  06/30/22 2053

## 2022-07-01 NOTE — ED PROVIDER NOTES
grandmother; COPD in her father and maternal grandfather; Cancer in her mother and paternal grandfather; Clotting Disorder in her father and mother; Diabetes in her father and mother; Heart Disease in her father and paternal grandfather; High Blood Pressure in her maternal grandmother and mother; Tuberculosis in her paternal grandmother. Psychiatric History: None    Allergies: Latex, Red dye, Scopolamine, Asa [aspirin], Brexpiprazole, Duloxetine, Naproxen, Nitrofurantoin, and Other    Home Medications:   Prior to Admission medications    Medication Sig Start Date End Date Taking? Authorizing Provider   gabapentin (NEURONTIN) 100 MG capsule Take 100 mg by mouth 3 times daily. Historical Provider, MD   ALPRAZolam Eamon Roller) 0.5 MG tablet Take 0.5 mg by mouth nightly as needed. Patient not taking: Reported on 1/4/2022 11/9/21   Historical Provider, MD   primidone (MYSOLINE) 50 MG tablet Take one tab nightly 11/23/21   Ramsey Cornejo MD   Cholecalciferol (VITAMIN D3) 0841121 UNIT/GM LIQD 4 ml in AM, 2 ml in afternoon and 4 ml HS    Historical Provider, MD   Amphetamine (ADZENYS XR-ODT PO) Take by mouth    Historical Provider, MD   PROAIR  (90 BASE) MCG/ACT inhaler  10/14/15   Historical Provider, MD   butalbital-acetaminophen-caffeine (FIORICET, ESGIC) per tablet Take 1 tablet by mouth as needed for Pain. Patient not taking: Reported on 1/4/2022    Historical Provider, MD   Fluticasone Propionate  HFA (FLOVENT HFA IN) Inhale  into the lungs. Historical Provider, MD       REVIEW OF SYSTEMS  (2-9 systems for level 4, 10 ormore for level 5)      Review of Systems   Constitutional: Negative. HENT: Positive for ear pain. Negative for congestion, dental problem, drooling, ear discharge, facial swelling, hearing loss, mouth sores, nosebleeds, postnasal drip, rhinorrhea, sinus pressure, sinus pain, sneezing, sore throat, tinnitus, trouble swallowing and voice change.          Left ear pain   Eyes: Negative. Respiratory: Negative. Cardiovascular: Negative. Gastrointestinal: Negative. Endocrine: Negative. Genitourinary: Negative. Musculoskeletal: Negative. Skin: Negative. Neurological: Negative. Hematological: Negative. Psychiatric/Behavioral: Negative. All other systems negative except as marked. PHYSICAL EXAM  (up to 7 for level 4, 8 or more for level 5)      INITIAL VITALS:  height is 5' 2\" (1.575 m) and weight is 52.6 kg (116 lb). Her oral temperature is 98.4 °F (36.9 °C). Her blood pressure is 145/79 (abnormal) and her pulse is 86. Her respiration is 18 and oxygen saturation is 98%. Vital signs reviewed. Physical Exam  Constitutional:       Appearance: Normal appearance. HENT:      Head: Normocephalic. Right Ear: Tympanic membrane, ear canal and external ear normal.      Left Ear: There is impacted cerumen. Nose: Nose normal.      Mouth/Throat:      Mouth: Mucous membranes are moist.      Pharynx: Oropharynx is clear. Eyes:      Extraocular Movements: Extraocular movements intact. Conjunctiva/sclera: Conjunctivae normal.      Pupils: Pupils are equal, round, and reactive to light. Cardiovascular:      Rate and Rhythm: Normal rate and regular rhythm. Pulses: Normal pulses. Heart sounds: Normal heart sounds. Pulmonary:      Effort: Pulmonary effort is normal.      Breath sounds: Normal breath sounds. Abdominal:      General: Bowel sounds are normal. There is no distension. Palpations: Abdomen is soft. Tenderness: There is no abdominal tenderness. There is no guarding. Musculoskeletal:         General: Normal range of motion. Cervical back: Normal range of motion. Skin:     General: Skin is warm and dry. Capillary Refill: Capillary refill takes less than 2 seconds. Neurological:      Mental Status: She is alert and oriented to person, place, and time.    Psychiatric:         Mood and Affect: Mood normal.         Behavior: Behavior normal.         Thought Content: Thought content normal.         Judgment: Judgment normal.           DIFFERENTIAL DIAGNOSIS / MDM     Upon examination, patient resting comfortably in her room. She appears nontoxic no distress is noted. Heart sounds are normal limits upon auscultation. Lung sounds are clear and equal bilaterally. Bowel sounds are present in all 4 quadrants auscultation. No abdominal distention tenderness or guarding noted. Upon examination of the right ear, TMs within normal limits, canals within normal limits, external ears within normal limits. Upon examination of left ear, there is impacted cerumen noted. Patient has no mastoid bone tenderness or tragus tenderness with palpation. I did attempt to remove some of the earwax with a curette. I will order for your eczema while using irrigation and reassess. Significant amount of earwax removed from the left ear. Patient reports improvement of her symptoms. TM is intact. No erythema noted. No tragus or mastoid bone tenderness noted. Discharge patient home with follow-up with her primary care physician. Please return to the emergency part with any new concerning worsening symptoms specially including any hearing changes, fever, chills, body aches, or any other new concerning symptoms. Patient states understanding of education is stable for outpatient follow-up. PLAN (LABS / IMAGING / EKG):  Orders Placed This Encounter   Procedures    Ear wax removal       MEDICATIONS ORDERED:  No orders of the defined types were placed in this encounter. Controlled Substances Monitoring:     DIAGNOSTIC RESULTS     EKG: All EKG's are interpreted by the Emergency Department Physician who either signs or Co-signs this chart in the absenceof a cardiologist.      RADIOLOGY: All images are read by the radiologist and their interpretations are reviewed.     No orders to display       LABS:  No results found for this visit on 06/30/22. EMERGENCY DEPARTMENT COURSE           Vitals:    Vitals:    06/30/22 2024   BP: (!) 145/79   Pulse: 86   Resp: 18   Temp: 98.4 °F (36.9 °C)   TempSrc: Oral   SpO2: 98%   Weight: 52.6 kg (116 lb)   Height: 5' 2\" (1.575 m)     -------------------------  BP: (!) 145/79, Temp: 98.4 °F (36.9 °C), Heart Rate: 86, Resp: 18      RE-EVALUATION:  See ED Course notes above. CONSULTS:  None    PROCEDURES:  None    FINAL IMPRESSION      1. Impacted cerumen of left ear          DISPOSITION / PLAN     CONDITION ON DISPOSITION:   Good / Stable for discharge.      PATIENT REFERRED TO:  ALEKSANDRA Ascencio CNP  [address] [facility]  [phone]    Schedule an appointment as soon as possible for a visit       [facility] ED  [address] [place], [place] [id number] [phone]  Go to   If symptoms worsen      DISCHARGE MEDICATIONS:  Current Discharge Medication List          ALEKSANDRA Kirby NP   Emergency Medicine Nurse Practitioner    (Please note that portions of this note were completed with a voice recognition program.  Efforts were made to edit the dictations but occasionally words aremis-transcribed.)     ALEKSANDRA Kirby NP  06/30/22 2057

## 2022-08-02 ENCOUNTER — HOSPITAL ENCOUNTER (OUTPATIENT)
Dept: MAMMOGRAPHY | Age: 47
Discharge: HOME | End: 2022-08-04
Payer: COMMERCIAL

## 2022-08-02 DIAGNOSIS — M81.0 OSTEOPOROSIS, UNSPECIFIED OSTEOPOROSIS TYPE, UNSPECIFIED PATHOLOGICAL FRACTURE PRESENCE: ICD-10-CM

## 2022-08-02 PROCEDURE — 77080 DXA BONE DENSITY AXIAL: CPT

## 2023-01-26 ENCOUNTER — HOSPITAL ENCOUNTER (OUTPATIENT)
Dept: GENERAL RADIOLOGY | Facility: CLINIC | Age: 48
Discharge: HOME | End: 2023-01-28
Payer: COMMERCIAL

## 2023-01-26 ENCOUNTER — HOSPITAL ENCOUNTER (OUTPATIENT)
Facility: CLINIC | Age: 48
Discharge: HOME | End: 2023-01-28
Payer: COMMERCIAL

## 2023-01-26 DIAGNOSIS — M54.2 NECK PAIN ON RIGHT SIDE: ICD-10-CM

## 2023-01-26 PROCEDURE — 72040 X-RAY EXAM NECK SPINE 2-3 VW: CPT

## 2023-07-17 ENCOUNTER — APPOINTMENT (OUTPATIENT)
Dept: GENERAL RADIOLOGY | Facility: CLINIC | Age: 48
End: 2023-07-17
Payer: COMMERCIAL

## 2023-07-17 ENCOUNTER — HOSPITAL ENCOUNTER (EMERGENCY)
Facility: CLINIC | Age: 48
Discharge: HOME | End: 2023-07-17
Attending: EMERGENCY MEDICINE
Payer: COMMERCIAL

## 2023-07-17 VITALS
WEIGHT: 147 LBS | HEART RATE: 70 BPM | SYSTOLIC BLOOD PRESSURE: 124 MMHG | TEMPERATURE: 98.6 F | DIASTOLIC BLOOD PRESSURE: 80 MMHG | HEIGHT: 64 IN | OXYGEN SATURATION: 98 % | BODY MASS INDEX: 25.1 KG/M2 | RESPIRATION RATE: 15 BRPM

## 2023-07-17 DIAGNOSIS — M79.601 RIGHT ARM PAIN: ICD-10-CM
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
## 2023-07-17 PROCEDURE — 73030 X-RAY EXAM OF SHOULDER: CPT

## 2023-07-17 PROCEDURE — 73060 X-RAY EXAM OF HUMERUS: CPT

## 2023-07-17 PROCEDURE — 73090 X-RAY EXAM OF FOREARM: CPT

## 2023-07-17 PROCEDURE — 99283 EMERGENCY DEPT VISIT LOW MDM: CPT

## 2023-07-17 ASSESSMENT — PAIN - FUNCTIONAL ASSESSMENT: PAIN_FUNCTIONAL_ASSESSMENT: 0-10

## 2023-07-17 ASSESSMENT — PAIN SCALES - GENERAL: PAINLEVEL_OUTOF10: 3

## 2023-07-17 NOTE — ED PROVIDER NOTES
[facility]  [address]  Phone: [phone]        Pt Name: Nelia Roman  MRN: [id number]  [address] [facility] 1975  Date of evaluation: 7/17/23    [address] [facility]       Chief Complaint   Patient presents with    Arm Pain    Fall       HISTORY OF PRESENT ILLNESS (Location/Symptom, Timing/Onset, Context/Setting, Quality, Duration, Modifying Factors, Severity)      Nelia Roman is a 56 y.o. female with pertinent PMH of MS who presents to the ED via private auto with shoulder/arm pain. Patient states that she was bending over to  something and she lost her balance and fell consequently landing on her right hand/arm outstretched. She has since been experiencing pain and points to the right forearm but also to the upper arm. It is worse when she moves. They just wanted to get it evaluated. Caregiver notes that she has a history of chronic right shoulder pain and this is not necessarily new but feels it is worse. She has taken some OTC pain meds with some mild improvement. Denies any fever, chills, numbness, weakness, paresthesias, pain to the surrounding joints, head injury, any other injuries, or any other concerns at this time. PAST MEDICAL / SURGICAL / SOCIAL / FAMILY HISTORY     PMH:  has a past medical history of Abnormal Pap smear, Abnormal Pap smear of cervix, Anxiety, Asthma, Depression, Migraine, Multiple sclerosis ([address]), and Post-traumatic stress syndrome. Surgical History:  has a past surgical history that includes Colposcopy; [place] tooth extraction; Corneal transplant (Bilateral); Tear duct surgery (Bilateral); Eye surgery; Hysterectomy; Tibia fracture surgery (Right); and Ankle fracture surgery (Right, 12/29/2021). Social History:  reports that she has never smoked. She has never used smokeless tobacco. She reports that she does not currently use alcohol. She reports that she does not use drugs.   Family History: She indicated that her mother is

## 2023-07-17 NOTE — ED PROVIDER NOTES
I was present in the ED during this patient's evaluation and management by the Advance Practice Provider and was available to address any concerns about their medical management.     Kelsi Kwon MD  Attending, Emergency Department       Patricia Cameron MD  07/17/23 7309

## 2023-07-18 NOTE — ED PROVIDER NOTES
I performed a history and physical examination of the patient and discussed management with the mid level provideer. I reviewed the mid level provider's note and agree with the documented findings and plan of care. Any areas of disagreement are noted on the chart. I was personally present for the key portions of any procedures. I have documented in the chart those procedures where I was not present during the key portions. I have reviewed the emergency nurses triage note. I agree with the chief complaint, past medical history, past surgical history, allergies, medications, social and family history as documented unless otherwise noted below. Documentation of the HPI, Physical Exam and Medical Decision Making performed by medical students or scribes is based on my personal performance of the HPI, PE and MDM. For Physician Assistant/ Nurse Practitioner cases/documentation I have personally evaluated this patient and have completed at least one if not all key elements of the E/M (history, physical exam, and MDM). Additional findings are as noted. Patient is a 60-year-old female with history of MS presenting with a mechanical fall from a couch presenting with right arm and shoulder pain. On exam vitals normal patient is in no acute distress. Right upper extremity is neurovascularly intact and there is no real bony tenderness to be found. Plain films of the upper extremity were obtained by midlevel and showed no evidence of acute traumatic injuries. Patient reassured and discharged home.     Lucy Mart DO  Emergency Medicine Physician  8:24 PM         Hayden Zhao DO  07/17/23 5828

## 2023-07-18 NOTE — DISCHARGE INSTRUCTIONS
You can take ibuprofen and acetaminophen as prescribed as needed. Otherwise, utilize rest and ice for 20 minutes several times a day. PLEASE RETURN TO THE EMERGENCY DEPARTMENT IMMEDIATELY if your symptoms worsen in anyway or in 1-2 days if not improved for re-evaluation. You should immediately return to the ER for symptoms such as new or worsening pain, fever, numbness or weakness to the arms or legs, coolness or color change of the arms or legs. [facility]!!!    From [facility]) and [address] [place] [address] Emergency Services    On behalf of the Emergency Department staff at [facility]), I would like to thank you for giving us the opportunity to address your health care needs and concerns. We hope that during your visit, our service was delivered in a professional and caring manner. Please keep [facility] ([facility]) in mind as we walk with you down the path to your own personal wellness. Please expect an automated text message or email from us so we can ask a few questions about your health and progress. Based on your answers, a clinician may call you back to offer help and instructions. Please understand that early in the process of an illness or injury, an emergency department workup can be falsely reassuring. If you notice any worsening, changing or persistent symptoms please call your family doctor or return to the ER immediately. Tell us how we did during your visit at [email or web address]   and let us know about your experience

## 2023-12-27 ENCOUNTER — HOSPITAL ENCOUNTER (OUTPATIENT)
Facility: CLINIC | Age: 48
Discharge: HOME | End: 2023-12-27
Payer: COMMERCIAL

## 2023-12-27 ENCOUNTER — HOSPITAL ENCOUNTER (EMERGENCY)
Facility: CLINIC | Age: 48
Discharge: HOME | End: 2023-12-27
Attending: EMERGENCY MEDICINE
Payer: COMMERCIAL

## 2023-12-27 VITALS
SYSTOLIC BLOOD PRESSURE: 121 MMHG | DIASTOLIC BLOOD PRESSURE: 67 MMHG | HEIGHT: 64 IN | RESPIRATION RATE: 18 BRPM | BODY MASS INDEX: 25.1 KG/M2 | WEIGHT: 147 LBS | OXYGEN SATURATION: 97 % | TEMPERATURE: 98.5 F | HEART RATE: 68 BPM

## 2023-12-27 DIAGNOSIS — R60.0 LEG EDEMA, RIGHT: Primary | ICD-10-CM

## 2023-12-27 LAB
BASOPHILS # BLD: <0.03 K/UL (ref 0–0.2)
BASOPHILS NFR BLD: 0 % (ref 0–2)
D DIMER PPP FEU-MCNC: 4.06 UG/ML FEU
EOSINOPHIL # BLD: 0.24 K/UL (ref 0–0.44)
EOSINOPHILS RELATIVE PERCENT: 4 % (ref 1–4)
ERYTHROCYTE [DISTWIDTH] IN BLOOD BY AUTOMATED COUNT: 12.7 % (ref 11.8–14.4)
HCT VFR BLD AUTO: 38.1 % (ref 36.3–47.1)
HGB BLD-MCNC: 12.8 G/DL (ref 11.9–15.1)
IMM GRANULOCYTES # BLD AUTO: <0.03 K/UL (ref 0–0.3)
IMM GRANULOCYTES NFR BLD: 0 %
LYMPHOCYTES NFR BLD: 1.76 K/UL (ref 1.1–3.7)
LYMPHOCYTES RELATIVE PERCENT: 27 % (ref 24–43)
MCH RBC QN AUTO: 32.5 PG (ref 25.2–33.5)
MCHC RBC AUTO-ENTMCNC: 33.6 G/DL (ref 28.4–34.8)
MCV RBC AUTO: 96.7 FL (ref 82.6–102.9)
MONOCYTES NFR BLD: 0.43 K/UL (ref 0.1–1.2)
MONOCYTES NFR BLD: 7 % (ref 3–12)
NEUTROPHILS NFR BLD: 62 % (ref 36–65)
NEUTS SEG NFR BLD: 4.12 K/UL (ref 1.5–8.1)
NRBC BLD-RTO: 0 PER 100 WBC
PLATELET # BLD AUTO: 243 K/UL (ref 138–453)
PMV BLD AUTO: 11.6 FL (ref 8.1–13.5)
RBC # BLD AUTO: 3.94 M/UL (ref 3.95–5.11)
WBC OTHER # BLD: 6.6 K/UL (ref 3.5–11.3)

## 2023-12-27 PROCEDURE — 6360000002 HC RX W HCPCS: Performed by: EMERGENCY MEDICINE

## 2023-12-27 PROCEDURE — 85379 FIBRIN DEGRADATION QUANT: CPT

## 2023-12-27 PROCEDURE — 85025 COMPLETE CBC W/AUTO DIFF WBC: CPT

## 2023-12-27 PROCEDURE — 99284 EMERGENCY DEPT VISIT MOD MDM: CPT

## 2023-12-27 PROCEDURE — 80053 COMPREHEN METABOLIC PANEL: CPT

## 2023-12-27 PROCEDURE — 84443 ASSAY THYROID STIM HORMONE: CPT

## 2023-12-27 PROCEDURE — 82306 VITAMIN D 25 HYDROXY: CPT

## 2023-12-27 PROCEDURE — 36415 COLL VENOUS BLD VENIPUNCTURE: CPT

## 2023-12-27 PROCEDURE — 96372 THER/PROPH/DIAG INJ SC/IM: CPT

## 2023-12-27 RX ORDER — ENOXAPARIN SODIUM 100 MG/ML
1 INJECTION SUBCUTANEOUS ONCE
Status: COMPLETED | OUTPATIENT
Start: 2023-12-27 | End: 2023-12-27

## 2023-12-27 RX ADMIN — ENOXAPARIN SODIUM 70 MG: 100 INJECTION SUBCUTANEOUS at 20:56

## 2023-12-28 LAB
25(OH)D3 SERPL-MCNC: 32.1 NG/ML (ref 30–100)
ALBUMIN SERPL-MCNC: 4 G/DL (ref 3.5–5.2)
ALBUMIN/GLOB SERPL: 1 {RATIO} (ref 1–2.5)
ALP SERPL-CCNC: 97 U/L (ref 35–104)
ALT SERPL-CCNC: 54 U/L (ref 10–35)
ANION GAP SERPL CALCULATED.3IONS-SCNC: 14 MMOL/L (ref 9–16)
AST SERPL-CCNC: 29 U/L (ref 10–35)
BILIRUB SERPL-MCNC: 0.3 MG/DL (ref 0–1.2)
BUN SERPL-MCNC: 17 MG/DL (ref 6–20)
CALCIUM SERPL-MCNC: 9.2 MG/DL (ref 8.6–10.4)
CHLORIDE SERPL-SCNC: 104 MMOL/L (ref 98–107)
CO2 SERPL-SCNC: 21 MMOL/L (ref 20–31)
CREAT SERPL-MCNC: 0.7 MG/DL (ref 0.5–0.9)
GFR SERPL CREATININE-BSD FRML MDRD: >60 ML/MIN/1.73M2
GLUCOSE SERPL-MCNC: 78 MG/DL (ref 74–99)
POTASSIUM SERPL-SCNC: 3.6 MMOL/L (ref 3.7–5.3)
PROT SERPL-MCNC: 7.1 G/DL (ref 6.6–8.7)
SODIUM SERPL-SCNC: 139 MMOL/L (ref 136–145)
TSH SERPL DL<=0.05 MIU/L-ACNC: 1.29 UIU/ML (ref 0.27–4.2)

## 2023-12-28 NOTE — ED NOTES
Pt arrives in wheelchair via private auto with family. Pt C/O R leg swelling,  noticed today. Pt reports no pain. Pt is non ambulatory, has H/O surgery to right leg. Pt denies chest pain, or SOB. Skin in lower ext PMS intact. A/Ox4. Denies cough, sore throat or other S/S of illness.

## 2023-12-28 NOTE — DISCHARGE INSTRUCTIONS
Follow up tomorrow to get your ultrasound done of your legs to rule out a blood clot. You were given a blood thinner in the ER to cover you for 24 hours. Take your medication as indicated and prescribed. For pain use acetaminophen (Tylenol) or ibuprofen (Motrin / Advil), unless prescribed medications that have acetaminophen or ibuprofen (or similar medications) in it. You can take over the counter acetaminophen tablets (1 - 2 tablets of the 500-mg strength every 6 hours) or ibuprofen tablets (2 tablets every 4 hours). PLEASE RETURN TO THE EMERGENCY DEPARTMENT IMMEDIATELY for worsening of pain, decrease sensation to arms or legs, inability to move arms or legs, shortness of breath, severe chest pain, excessive nausea or vomiting, notice any bruising to your abdomen or have increase in abdominal pain, or if you develop any concerning symptoms such as: high fever not relieved by acetaminophen (Tylenol) and/or ibuprofen (Motrin / Advil), chills, feeling of your heart fluttering or racing, persistent nausea and/or vomiting, vomiting up blood, blood in your stool, loss of consciousness, numbness, weakness or tingling in the arms or legs or change in color of the extremities, changes in mental status, persistent headache, blurry vision, loss of bladder / bowel control, unable to follow up with your physician, or other any other care or concern.

## 2024-03-21 ENCOUNTER — HOSPITAL ENCOUNTER (OUTPATIENT)
Facility: CLINIC | Age: 49
Discharge: HOME | End: 2024-03-23
Payer: COMMERCIAL

## 2024-03-21 ENCOUNTER — HOSPITAL ENCOUNTER (OUTPATIENT)
Dept: GENERAL RADIOLOGY | Facility: CLINIC | Age: 49
Discharge: HOME | End: 2024-03-23
Payer: COMMERCIAL

## 2024-03-21 DIAGNOSIS — M79.602 PAIN OF LEFT UPPER EXTREMITY: ICD-10-CM

## 2024-03-21 PROCEDURE — 73030 X-RAY EXAM OF SHOULDER: CPT

## 2024-06-04 ENCOUNTER — APPOINTMENT (OUTPATIENT)
Dept: GENERAL RADIOLOGY | Facility: CLINIC | Age: 49
End: 2024-06-04
Payer: COMMERCIAL

## 2024-06-04 ENCOUNTER — HOSPITAL ENCOUNTER (EMERGENCY)
Facility: CLINIC | Age: 49
Discharge: HOME | End: 2024-06-04
Attending: EMERGENCY MEDICINE
Payer: COMMERCIAL

## 2024-06-04 VITALS
OXYGEN SATURATION: 97 % | HEART RATE: 72 BPM | WEIGHT: 138 LBS | DIASTOLIC BLOOD PRESSURE: 82 MMHG | TEMPERATURE: 98.9 F | BODY MASS INDEX: 23.69 KG/M2 | SYSTOLIC BLOOD PRESSURE: 123 MMHG | RESPIRATION RATE: 19 BRPM

## 2024-06-04 DIAGNOSIS — S90.31XA CONTUSION OF RIGHT FOOT, INITIAL ENCOUNTER: Primary | ICD-10-CM

## 2024-06-04 PROCEDURE — 6370000000 HC RX 637 (ALT 250 FOR IP): Performed by: EMERGENCY MEDICINE

## 2024-06-04 PROCEDURE — 73630 X-RAY EXAM OF FOOT: CPT

## 2024-06-04 PROCEDURE — 99283 EMERGENCY DEPT VISIT LOW MDM: CPT

## 2024-06-04 RX ORDER — ACETAMINOPHEN 500 MG
1000 TABLET ORAL ONCE
Status: COMPLETED | OUTPATIENT
Start: 2024-06-04 | End: 2024-06-04

## 2024-06-04 RX ADMIN — ACETAMINOPHEN 1000 MG: 500 TABLET ORAL at 16:00

## 2024-06-04 NOTE — ED PROVIDER NOTES
[facility]  eMERGENCY dEPARTMENT eNCOUnter      Pt Name: Nicolasa Segura  MRN: [id number]  Birthdate 1975  Date of evaluation: 6/4/2024  Provider: Sharath Waters PA-C    CHIEF COMPLAINT       Chief Complaint   Patient presents with    Foot Injury     Pt foot slipped off couch and struck endtable - right lateral foot pain. Pt has MS.            HISTORY OF PRESENT ILLNESS  (Location/Symptom, Timing/Onset, Context/Setting, Quality, Duration, Modifying Factors, Severity.)   Nicolasa Segura is a 48 y.o. female who presents to the emergency department with significant other with the complaints of right foot pain.  Patient has MS and is in a wheelchair.  Was moving her foot while at home and bumped against a table and they present for evaluation.      Nursing Notes were reviewed.    REVIEW OF SYSTEMS    (2-9 systems for level 4, 10 or more for level 5)     Review of Systems     Except as noted above the remainder of the review of systems was reviewed and negative.       PAST MEDICAL HISTORY     Past Medical History:   Diagnosis Date    Abnormal Pap smear     unsure when  or what    Abnormal Pap smear of cervix 10/21/15     LSIL (-)HPV    Anxiety     Asthma     Depression     Migraine     Multiple sclerosis (HCC)     Post-traumatic stress syndrome      None otherwise stated in nurses notes    SURGICAL HISTORY       Past Surgical History:   Procedure Laterality Date    ANKLE FRACTURE SURGERY Right 12/29/2021    RIGHT TIB FIB OPEN REDUCTION INTERNAL FIXATION WITH CHRISTOPHER performed by Livan Mcfadden MD at [facility] OR    COLPOSCOPY      CORNEAL TRANSPLANT Bilateral     EYE SURGERY      stye removal    HYSTERECTOMY (CERVIX STATUS UNKNOWN)      TEAR DUCT SURGERY Bilateral     TIBIA FRACTURE SURGERY Right     WISDOM TOOTH EXTRACTION       None otherwise stated in nurses notes    CURRENT MEDICATIONS       Previous Medications    ALPRAZOLAM (XANAX) 0.5 MG TABLET    Take 0.5 mg by mouth nightly as

## 2024-06-04 NOTE — ED PROVIDER NOTES
[facility]  [address] [facility] [address]  Phone: [phone]      Attending Physician Attestation    I performed a history and physical examination of the patient and discussed management with the mid level provider. I reviewed the mid level provider's note and agree with the documented findings and plan of care. Any areas of disagreement are noted on the chart. I was personally present for the key portions of any procedures. I have documented in the chart those procedures where I was not present during the key portions. I have reviewed the emergency nurses triage note. I agree with the chief complaint, past medical history, past surgical history, allergies, medications, social and family history as documented unless otherwise noted below. Documentation of the HPI, Physical Exam and Medical Decision Making performed by mid level providers is based on my personal performance of the HPI, PE and MDM. For Physician Assistant/ Nurse Practitioner cases/documentation I have personally evaluated this patient and have completed at least one if not all key elements of the E/M (history, physical exam, and MDM). Additional findings are as noted.      CHIEF COMPLAINT       Chief Complaint   Patient presents with    Foot Injury     Pt foot slipped off couch and struck endtable - right lateral foot pain. Pt has MS.         PAST MEDICAL HISTORY     Past Medical History:   Diagnosis Date    Abnormal Pap smear     unsure when  or what    Abnormal Pap smear of cervix 10/21/15     LSIL (-)HPV    Anxiety     Asthma     Depression     Migraine     Multiple sclerosis (HCC)     Post-traumatic stress syndrome        SURGICAL HISTORY      has a past surgical history that includes Colposcopy; [place] tooth extraction; Corneal transplant (Bilateral); Tear duct surgery (Bilateral); Eye surgery; Hysterectomy; Tibia fracture surgery (Right); and Ankle fracture surgery (Right, 12/29/2021).    CURRENT MEDICATIONS

## 2025-07-08 ENCOUNTER — HOSPITAL ENCOUNTER (EMERGENCY)
Facility: CLINIC | Age: 50
Discharge: HOME | End: 2025-07-08
Attending: STUDENT IN AN ORGANIZED HEALTH CARE EDUCATION/TRAINING PROGRAM
Payer: MEDICARE

## 2025-07-08 ENCOUNTER — APPOINTMENT (OUTPATIENT)
Dept: GENERAL RADIOLOGY | Facility: CLINIC | Age: 50
End: 2025-07-08
Attending: STUDENT IN AN ORGANIZED HEALTH CARE EDUCATION/TRAINING PROGRAM
Payer: MEDICARE

## 2025-07-08 VITALS
OXYGEN SATURATION: 98 % | SYSTOLIC BLOOD PRESSURE: 113 MMHG | TEMPERATURE: 97.9 F | RESPIRATION RATE: 16 BRPM | HEIGHT: 64 IN | BODY MASS INDEX: 17.93 KG/M2 | HEART RATE: 82 BPM | DIASTOLIC BLOOD PRESSURE: 65 MMHG | WEIGHT: 105 LBS

## 2025-07-08 DIAGNOSIS — M25.561 RIGHT KNEE PAIN, UNSPECIFIED CHRONICITY: Primary | ICD-10-CM

## 2025-07-08 DIAGNOSIS — M25.571 RIGHT ANKLE PAIN, UNSPECIFIED CHRONICITY: ICD-10-CM

## 2025-07-08 LAB — D DIMER PPP FEU-MCNC: <0.19 UG/ML FEU

## 2025-07-08 PROCEDURE — 99284 EMERGENCY DEPT VISIT MOD MDM: CPT

## 2025-07-08 PROCEDURE — 85379 FIBRIN DEGRADATION QUANT: CPT

## 2025-07-08 PROCEDURE — 73562 X-RAY EXAM OF KNEE 3: CPT

## 2025-07-08 PROCEDURE — 36415 COLL VENOUS BLD VENIPUNCTURE: CPT

## 2025-07-08 PROCEDURE — 6370000000 HC RX 637 (ALT 250 FOR IP): Performed by: STUDENT IN AN ORGANIZED HEALTH CARE EDUCATION/TRAINING PROGRAM

## 2025-07-08 PROCEDURE — 73610 X-RAY EXAM OF ANKLE: CPT

## 2025-07-08 RX ORDER — DIPHENHYDRAMINE HCL 25 MG
25 CAPSULE ORAL EVERY 6 HOURS PRN
COMMUNITY

## 2025-07-08 RX ORDER — ACETAMINOPHEN 500 MG
1000 TABLET ORAL EVERY 6 HOURS PRN
Qty: 56 TABLET | Refills: 0 | Status: SHIPPED | OUTPATIENT
Start: 2025-07-08 | End: 2025-07-15

## 2025-07-08 RX ORDER — ACETAMINOPHEN 500 MG
1000 TABLET ORAL ONCE
Status: COMPLETED | OUTPATIENT
Start: 2025-07-08 | End: 2025-07-08

## 2025-07-08 RX ORDER — CYCLOBENZAPRINE HCL 10 MG
5 TABLET ORAL ONCE
Status: COMPLETED | OUTPATIENT
Start: 2025-07-08 | End: 2025-07-08

## 2025-07-08 RX ORDER — CYCLOBENZAPRINE HCL 5 MG
5 TABLET ORAL 3 TIMES DAILY PRN
Qty: 21 TABLET | Refills: 0 | Status: SHIPPED | OUTPATIENT
Start: 2025-07-08 | End: 2025-07-15

## 2025-07-08 RX ORDER — OFATUMUMAB 20 MG/.4ML
INJECTION, SOLUTION SUBCUTANEOUS
COMMUNITY

## 2025-07-08 RX ADMIN — CYCLOBENZAPRINE 5 MG: 10 TABLET, FILM COATED ORAL at 22:45

## 2025-07-08 RX ADMIN — ACETAMINOPHEN 1000 MG: 500 TABLET ORAL at 22:45

## 2025-07-08 ASSESSMENT — PAIN DESCRIPTION - ORIENTATION: ORIENTATION: RIGHT

## 2025-07-08 ASSESSMENT — PAIN - FUNCTIONAL ASSESSMENT
PAIN_FUNCTIONAL_ASSESSMENT: 0-10
PAIN_FUNCTIONAL_ASSESSMENT: 0-10

## 2025-07-08 ASSESSMENT — PAIN DESCRIPTION - LOCATION: LOCATION: ANKLE;LEG

## 2025-07-08 ASSESSMENT — PAIN DESCRIPTION - DESCRIPTORS: DESCRIPTORS: SHOOTING

## 2025-07-08 ASSESSMENT — PAIN SCALES - GENERAL
PAINLEVEL_OUTOF10: 8
PAINLEVEL_OUTOF10: 0

## 2025-07-09 NOTE — ED PROVIDER NOTES
Postop changes distal fibula.     Diffuse osteopenia with no obvious acute bony abnormality   [MA]   0238 I have visualized final read of right ankle although right knee x-ray has not been over read by radiology at this time. [MA]      ED Course User Index  [MA] Kaci Gonzalez, DO                 The patient and/or family and I have discussed the diagnosis(es) and risks, and we agree with discharging home to follow-up with their pertinent providers. The patient appears stable for discharge and has been instructed to return immediately for new concerning symptoms or if the symptoms worsen in any way. The patient understands that at this time there is no evidence for a more malignant underlying process, but the patient also understands that early in the process of an illness or injury, an emergency department workup can be falsely reassuring. Routine discharge counseling was given, and the patient understands that worsening, changing or persistent symptoms should prompt an immediate call or follow up with their primary physician or return to the emergency department.     I have reviewed the disposition diagnosis with the patient and or their family/guardian. I have answered their questions and given discharge instructions. They voiced understanding of these instructions and did not have any further questions or complaints.    FINAL IMPRESSION      1. Right knee pain, unspecified chronicity    2. Right ankle pain, unspecified chronicity          DISPOSITION / PLAN     DISPOSITION Decision To Discharge 07/08/2025 11:36:59 PM   DISPOSITION CONDITION Stable           PATIENT REFERRED TO:  Faheem Sawyer MD  [address] [facility] [address] [phone]    Call   For Post Emergency Department Follow Up, call tomorrow for post emergency department follow-up    [facility] Emergency Department  [address] [facility] [address] [phone]    As needed, If symptoms worsen      DISCHARGE

## 2025-07-09 NOTE — ED NOTES
Pt presents to ED via wheelchair with family. Pt has hx of MS and is nonambulatory. Per pt and family pt started having R ankle/leg pain that started this morning. Pt states she thinks that she hit her ankle on the family members boot during a transfer as he picks her up to transfer her. Some swelling noted to the ankle/leg. Pt does have pins and rods to the ankle area. No otc meds given for pain. Pt does have hx of blood clot to that leg last year and currently takes blood thinner

## 2025-07-09 NOTE — DISCHARGE INSTRUCTIONS
SUMMARY OF YOUR VISIT    Today you were seen for evaluation emergency department.  We discussed your x-ray imaging.  I recommend you follow-up with your orthopedic surgeon at [facility], call their office tomorrow to discuss post emergency department follow-up and reevaluation.    I prescribed Tylenol and Flexeril to be used as needed for pain control.    Please continue to take your home medication as previously prescribed, I have made no changes to your home medications.        You can return to our or another Emergency Department as needed or for worsening symptoms of chest pain, shortness of breath, high fevers not relieved by acetaminophen (Tylenol) and/or ibuprofen (Motrin / Advil), chills, feeling of your heart fluttering or racing, persistent nausea and/or vomiting, vomiting up blood, blood in your stool, loss of consciousness, numbness, weakness or tingling in the arms or legs or change in color of the extremities, changes in mental status, persistent headache, blurry vision, loss of bladder / bowel control, if you are unable to follow up with your physician, or other any other care or concern.    Thank You!    On behalf of the Emergency Department staff and team, I would like to thank you for allowing us the opportunity to participate in your health care and evaluation today.

## (undated) DEVICE — REAMER SHAFT, MOD.TRINKLE: Brand: BIXCUT

## (undated) DEVICE — YANKAUER,FLEXIBLE HANDLE,REGLR CAPACITY: Brand: MEDLINE INDUSTRIES, INC.

## (undated) DEVICE — BANDAGE COMPR W6INXL12FT SMOOTH FOR LIMB EXSANG ESMARCH

## (undated) DEVICE — DRAPE,U/ SHT,SPLIT,PLAS,STERIL: Brand: MEDLINE

## (undated) DEVICE — PADDING CAST W6INXL4YD COT LO LINTING WYTEX

## (undated) DEVICE — 3M™ STERI-DRAPE™ U-DRAPE 1015: Brand: STERI-DRAPE™

## (undated) DEVICE — CHLORAPREP 26ML ORANGE

## (undated) DEVICE — GLOVE SURG SZ 8 CRM LTX FREE POLYISOPRENE POLYMER BEAD ANTI

## (undated) DEVICE — FREEHAND DRILL

## (undated) DEVICE — LOCKING DRILL

## (undated) DEVICE — INTENDED FOR TISSUE SEPARATION, AND OTHER PROCEDURES THAT REQUIRE A SHARP SURGICAL BLADE TO PUNCTURE OR CUT.: Brand: BARD-PARKER ® CARBON RIB-BACK BLADES

## (undated) DEVICE — SUTURE VCRL SZ 2-0 L27IN ABSRB UD L26MM CT-2 1/2 CIR J269H

## (undated) DEVICE — GLOVE SURG SZ 85 L12IN FNGR THK79MIL GRN LTX FREE

## (undated) DEVICE — BNDG,ELSTC,MATRIX,STRL,6"X5YD,LF,HOOK&LP: Brand: MEDLINE

## (undated) DEVICE — GUIDE WIRE, BALL-TIPPED, STERILE

## (undated) DEVICE — SUTURE ETHLN SZ 3-0 L18IN NONABSORBABLE BLK PS-2 L19MM 3/8 1669H

## (undated) DEVICE — DRESSING PETRO W3XL8IN OIL EMUL N ADH GZ KNIT IMPREG CELOS

## (undated) DEVICE — HOLDING PIN: Brand: ANCHORAGE

## (undated) DEVICE — PADDING UNDERCAST W6INXL4YD WYTEX 6 PER BG

## (undated) DEVICE — C-ARMOR C-ARM EQUIPMENT COVERS CLEAR STERILE UNIVERSAL FIT 12 PER CASE: Brand: C-ARMOR

## (undated) DEVICE — KIRSCHNER WIRE , L, TIPS TROCAR , SMOOTH
Type: IMPLANTABLE DEVICE | Site: TIBIA | Status: NON-FUNCTIONAL
Removed: 2021-12-29

## (undated) DEVICE — SPONGE LAP W18XL18IN WHT COT 4 PLY FLD STRUNG RADPQ DISP ST

## (undated) DEVICE — SMARTGOWN SURGICAL GOWN, 3XL, LONG: Brand: CONVERTORS

## (undated) DEVICE — MHPB HAND AND FOOT PACK: Brand: MEDLINE INDUSTRIES, INC.

## (undated) DEVICE — TOWEL,OR,DSP,ST,BLUE,STD,4/PK,20PK/CS: Brand: MEDLINE

## (undated) DEVICE — 3M™ STERI-DRAPE™ INCISE DRAPE 1050 (60CM X 45CM): Brand: STERI-DRAPE™

## (undated) DEVICE — PADDING UNDERCAST W4INXL4YD COT FBR LO LINTING WYTEX

## (undated) DEVICE — 3M™ WARMING BLANKET, UPPER BODY, 10 PER CASE, 42268: Brand: BAIR HUGGER™

## (undated) DEVICE — DRAPE,REIN 53X77,STERILE: Brand: MEDLINE

## (undated) DEVICE — PADDING,UNDERCAST,COTTON, 4"X4YD STERILE: Brand: MEDLINE

## (undated) DEVICE — GARMENT,MEDLINE,DVT,INT,CALF,MED, GEN2: Brand: MEDLINE

## (undated) DEVICE — DRILL BIT, AO DIA2.6MM X 135MM, SCALED: Brand: VARIAX

## (undated) DEVICE — NAIL INSERTION SLEEVE, ELASTIC SPI DIAM.8-11: Brand: T2

## (undated) DEVICE — Z DISCONTINUED BY MEDLINE USE 2271199 TRAY PREP WET 4% CHG SCRB SOL

## (undated) DEVICE — OVERDRILL AO, DIA3.5MM X 122MM: Brand: VARIAX

## (undated) DEVICE — CONTAINER,SPECIMEN,OR STERILE,4OZ: Brand: MEDLINE